# Patient Record
Sex: FEMALE | Race: OTHER | Employment: UNEMPLOYED | URBAN - METROPOLITAN AREA
[De-identification: names, ages, dates, MRNs, and addresses within clinical notes are randomized per-mention and may not be internally consistent; named-entity substitution may affect disease eponyms.]

---

## 2017-11-29 ENCOUNTER — TELEPHONE (OUTPATIENT)
Dept: PEDIATRICS CLINIC | Facility: CLINIC | Age: 27
End: 2017-11-29

## 2017-11-29 NOTE — TELEPHONE ENCOUNTER
Pt was advised our office did not call her today. Pt states no one left a msg. Pt was advised to wait for a return call. Pt agrees with plan.

## 2018-01-08 PROCEDURE — 88175 CYTOPATH C/V AUTO FLUID REDO: CPT | Performed by: FAMILY MEDICINE

## 2018-01-08 PROCEDURE — 87510 GARDNER VAG DNA DIR PROBE: CPT | Performed by: FAMILY MEDICINE

## 2018-01-08 PROCEDURE — 87660 TRICHOMONAS VAGIN DIR PROBE: CPT | Performed by: FAMILY MEDICINE

## 2018-01-08 PROCEDURE — 87491 CHLMYD TRACH DNA AMP PROBE: CPT | Performed by: FAMILY MEDICINE

## 2018-01-08 PROCEDURE — 87591 N.GONORRHOEAE DNA AMP PROB: CPT | Performed by: FAMILY MEDICINE

## 2018-01-08 PROCEDURE — 87480 CANDIDA DNA DIR PROBE: CPT | Performed by: FAMILY MEDICINE

## 2018-07-24 ENCOUNTER — TELEPHONE (OUTPATIENT)
Dept: OBGYN CLINIC | Facility: CLINIC | Age: 28
End: 2018-07-24

## 2018-07-26 ENCOUNTER — OFFICE VISIT (OUTPATIENT)
Dept: OBGYN CLINIC | Facility: CLINIC | Age: 28
End: 2018-07-26
Payer: COMMERCIAL

## 2018-07-26 VITALS
WEIGHT: 137 LBS | DIASTOLIC BLOOD PRESSURE: 80 MMHG | HEIGHT: 62 IN | SYSTOLIC BLOOD PRESSURE: 117 MMHG | HEART RATE: 66 BPM | BODY MASS INDEX: 25.21 KG/M2

## 2018-07-26 DIAGNOSIS — Z30.46 ENCOUNTER FOR REMOVAL OF SUBDERMAL CONTRACEPTIVE IMPLANT: ICD-10-CM

## 2018-07-26 DIAGNOSIS — Z32.00 ENCOUNTER FOR PREGNANCY TEST, RESULT UNKNOWN: Primary | ICD-10-CM

## 2018-07-26 DIAGNOSIS — Z30.46 NEXPLANON REMOVAL: ICD-10-CM

## 2018-07-26 LAB
CONTROL LINE PRESENT WITH A CLEAR BACKGROUND (YES/NO): YES YES/NO
KIT LOT #: NORMAL NUMERIC
PREGNANCY TEST, URINE: NEGATIVE

## 2018-07-26 PROCEDURE — 81025 URINE PREGNANCY TEST: CPT | Performed by: ADVANCED PRACTICE MIDWIFE

## 2018-07-26 PROCEDURE — 11982 REMOVE DRUG IMPLANT DEVICE: CPT | Performed by: ADVANCED PRACTICE MIDWIFE

## 2018-07-27 NOTE — PROCEDURES
Nexplanon Removal    Consent was obtained from the patient. Removal:  1 % lidocaine with Epinephrine was injected underneath the tip of the Nexplanon samia that is closest to the left elbow. Nexplanon was located by palpation.   3 mm incision was made at

## 2019-01-10 ENCOUNTER — TELEPHONE (OUTPATIENT)
Dept: OBGYN CLINIC | Facility: CLINIC | Age: 29
End: 2019-01-10

## 2019-01-10 DIAGNOSIS — Z87.59 HISTORY OF MISCARRIAGE: Primary | ICD-10-CM

## 2019-01-10 NOTE — TELEPHONE ENCOUNTER
Per MES. pt to go for quant bhcg & progesterone on Thursday of next week & will have discussion of results w/ cnm at missed menses appt on Friday 1/18. Pt called & notified of instructions. Advised to call office w/ any bleeding or concerns.  Pt verbalized a

## 2019-01-10 NOTE — TELEPHONE ENCOUNTER
PT said Pregnant and took blood test at [facility] for pregnancy at level 80 Jacob Correia wants pt to take another Bloodtest to see if doubling

## 2019-01-10 NOTE — TELEPHONE ENCOUNTER
LMP 12/13/18. Pt had hpt that was faint positive but had blood preg test 1/3 that was negative. Pt felt she was symptomatic for pregnancy yesterday & did hpt which was positive. Pt went to [facility] (not [facility]) & had quant hcg result of 81.  Was advised to go to ER

## 2019-01-17 ENCOUNTER — TELEPHONE (OUTPATIENT)
Dept: OBGYN CLINIC | Facility: CLINIC | Age: 29
End: 2019-01-17

## 2019-01-17 ENCOUNTER — APPOINTMENT (OUTPATIENT)
Dept: LAB | Facility: HOSPITAL | Age: 29
End: 2019-01-17
Attending: ADVANCED PRACTICE MIDWIFE
Payer: COMMERCIAL

## 2019-01-17 DIAGNOSIS — Z87.59 HISTORY OF MISCARRIAGE: ICD-10-CM

## 2019-01-17 DIAGNOSIS — N92.6 MISSED MENSES: ICD-10-CM

## 2019-01-17 LAB — PROGEST SERPL-MCNC: 23.4 NG/ML

## 2019-01-17 PROCEDURE — 36415 COLL VENOUS BLD VENIPUNCTURE: CPT

## 2019-01-17 PROCEDURE — 84702 CHORIONIC GONADOTROPIN TEST: CPT

## 2019-01-17 PROCEDURE — 84144 ASSAY OF PROGESTERONE: CPT

## 2019-01-17 NOTE — TELEPHONE ENCOUNTER
Spoke with pt and advised she is to come in today to lab for bhcg and progesterone. Pt agreed and voiced understanding.

## 2019-01-18 ENCOUNTER — OFFICE VISIT (OUTPATIENT)
Dept: OBGYN CLINIC | Facility: CLINIC | Age: 29
End: 2019-01-18
Payer: COMMERCIAL

## 2019-01-18 VITALS
SYSTOLIC BLOOD PRESSURE: 118 MMHG | HEART RATE: 94 BPM | DIASTOLIC BLOOD PRESSURE: 63 MMHG | HEIGHT: 62 IN | BODY MASS INDEX: 25.07 KG/M2 | WEIGHT: 136.25 LBS

## 2019-01-18 DIAGNOSIS — N92.6 MISSED MENSES: Primary | ICD-10-CM

## 2019-01-18 LAB
B-HCG SERPL-ACNC: 4391 MIU/ML
CONTROL LINE PRESENT WITH A CLEAR BACKGROUND (YES/NO): YES YES/NO
KIT LOT #: NORMAL NUMERIC
PREGNANCY TEST, URINE: POSITIVE

## 2019-01-18 PROCEDURE — 81025 URINE PREGNANCY TEST: CPT | Performed by: ADVANCED PRACTICE MIDWIFE

## 2019-01-18 PROCEDURE — 99213 OFFICE O/P EST LOW 20 MIN: CPT | Performed by: ADVANCED PRACTICE MIDWIFE

## 2019-01-18 NOTE — PROGRESS NOTES
HPI:    Patient ID: Christofer Hernandez is a 34year old female here for missed menses. Some nausea, no vomiting. Was having some pain last week and seen in urgent care and HCG was 81. Pain has gotten better, occasionally with have full/pressure feeling.  No dys after u/s for viability  10.   Greater than 50% of 20 minute visit spent in face to face counseling    Declined flu vaccine    Meds This Visit:  Requested Prescriptions      No prescriptions requested or ordered in this encounter       Imaging & Referrals:

## 2019-02-01 ENCOUNTER — TELEPHONE (OUTPATIENT)
Dept: OBGYN CLINIC | Facility: CLINIC | Age: 29
End: 2019-02-01

## 2019-02-01 ENCOUNTER — HOSPITAL ENCOUNTER (OUTPATIENT)
Dept: ULTRASOUND IMAGING | Facility: HOSPITAL | Age: 29
Discharge: HOME | End: 2019-02-01
Attending: ADVANCED PRACTICE MIDWIFE
Payer: COMMERCIAL

## 2019-02-01 DIAGNOSIS — N92.6 MISSED MENSES: ICD-10-CM

## 2019-02-01 PROCEDURE — 76801 OB US < 14 WKS SINGLE FETUS: CPT | Performed by: ADVANCED PRACTICE MIDWIFE

## 2019-02-01 NOTE — TELEPHONE ENCOUNTER
Pt presented to office requesting a note for proof of pregnancy and lifting restrictions. 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   Per MJ there are no lifting restrictions

## 2019-02-05 ENCOUNTER — TELEPHONE (OUTPATIENT)
Dept: OBGYN CLINIC | Facility: CLINIC | Age: 29
End: 2019-02-05

## 2019-02-05 NOTE — TELEPHONE ENCOUNTER
----- Message from Gerardo Hayes CNM sent at 2/5/2019  1:40 PM CST -----  Please notify pt of normal u/s which is consistent with dates. Small subchorionic bleed which typically resolves on its own. She should schedule Nurse visit.  Thanks, SAROJ

## 2019-02-11 ENCOUNTER — NURSE ONLY (OUTPATIENT)
Dept: OBGYN CLINIC | Facility: CLINIC | Age: 29
End: 2019-02-11
Payer: COMMERCIAL

## 2019-02-11 DIAGNOSIS — Z3A.08 8 WEEKS GESTATION OF PREGNANCY: Primary | ICD-10-CM

## 2019-02-11 RX ORDER — PRENATAL VIT/IRON FUM/FOLIC AC 27MG-0.8MG
1 TABLET ORAL DAILY
COMMUNITY

## 2019-03-06 NOTE — PROGRESS NOTES
Outpatient Maternal-Fetal Medicine Consultation    Dear Ms. Aditi Johnson    Thank you for requesting a first trimester ultrasound and MFM consultation on your patient Antonio Gonzales.   As you are aware she is a 34year old female [id number] with a singletonpregna . She indicated that her son is alive. Medications:   Current Outpatient Medications:   •  PRENATAL 27-0.8 MG Oral Tab, Take 1 tablet by mouth daily. , Disp: , Rfl:   Allergies: No Known Allergies    PHYSICAL EXAMINATION:  /73   Pulse 96 heterozygote - not clinically relevant    RECOMMENDATIONS:  · Continue care with Ms. Jean Claude Camp with FTS results when they become available. 20 anatomic survey - with OB or level I with MFM at the discretion of primary OB.     Thank you for allow

## 2019-03-07 ENCOUNTER — HOSPITAL ENCOUNTER (OUTPATIENT)
Dept: PERINATAL CARE | Facility: HOSPITAL | Age: 29
Discharge: HOME | End: 2019-03-07
Attending: OBSTETRICS & GYNECOLOGY
Payer: COMMERCIAL

## 2019-03-07 ENCOUNTER — HOSPITAL ENCOUNTER (OUTPATIENT)
Dept: PERINATAL CARE | Facility: HOSPITAL | Age: 29
Discharge: HOME | End: 2019-03-07
Attending: ADVANCED PRACTICE MIDWIFE
Payer: COMMERCIAL

## 2019-03-07 ENCOUNTER — LAB ENCOUNTER (OUTPATIENT)
Dept: LAB | Facility: HOSPITAL | Age: 29
End: 2019-03-07
Attending: ADVANCED PRACTICE MIDWIFE
Payer: COMMERCIAL

## 2019-03-07 ENCOUNTER — TELEPHONE (OUTPATIENT)
Dept: OBGYN CLINIC | Facility: CLINIC | Age: 29
End: 2019-03-07

## 2019-03-07 VITALS
DIASTOLIC BLOOD PRESSURE: 73 MMHG | HEART RATE: 96 BPM | SYSTOLIC BLOOD PRESSURE: 121 MMHG | BODY MASS INDEX: 25 KG/M2 | HEIGHT: 62 IN

## 2019-03-07 DIAGNOSIS — Z36.9 FIRST TRIMESTER SCREENING: Primary | ICD-10-CM

## 2019-03-07 DIAGNOSIS — Z15.89 HETEROZYGOUS MTHFR MUTATION C677T: ICD-10-CM

## 2019-03-07 DIAGNOSIS — Z36.9 FIRST TRIMESTER SCREENING: ICD-10-CM

## 2019-03-07 DIAGNOSIS — Z3A.08 8 WEEKS GESTATION OF PREGNANCY: ICD-10-CM

## 2019-03-07 LAB
ANTIBODY SCREEN: NEGATIVE
BASOPHILS # BLD AUTO: 0.05 X10(3) UL (ref 0–0.2)
BASOPHILS NFR BLD AUTO: 0.4 %
DEPRECATED RDW RBC AUTO: 45.1 FL (ref 35.1–46.3)
EOSINOPHIL # BLD AUTO: 0.08 X10(3) UL (ref 0–0.7)
EOSINOPHIL NFR BLD AUTO: 0.7 %
ERYTHROCYTE [DISTWIDTH] IN BLOOD BY AUTOMATED COUNT: 12.8 % (ref 11–15)
HBV SURFACE AG SER-ACNC: <0.1 [IU]/L
HBV SURFACE AG SERPL QL IA: NONREACTIVE
HCT VFR BLD AUTO: 38 % (ref 35–48)
HCV AB SERPL QL IA: NONREACTIVE
HGB BLD-MCNC: 12.5 G/DL (ref 12–16)
IMM GRANULOCYTES # BLD AUTO: 0.05 X10(3) UL (ref 0–1)
IMM GRANULOCYTES NFR BLD: 0.4 %
LYMPHOCYTES # BLD AUTO: 2.38 X10(3) UL (ref 1–4)
LYMPHOCYTES NFR BLD AUTO: 21.2 %
MCH RBC QN AUTO: 31.6 PG (ref 26–34)
MCHC RBC AUTO-ENTMCNC: 32.9 G/DL (ref 31–37)
MCV RBC AUTO: 96 FL (ref 80–100)
MONOCYTES # BLD AUTO: 0.64 X10(3) UL (ref 0.1–1)
MONOCYTES NFR BLD AUTO: 5.7 %
NEUTROPHILS # BLD AUTO: 8.01 X10 (3) UL (ref 1.5–7.7)
NEUTROPHILS # BLD AUTO: 8.01 X10(3) UL (ref 1.5–7.7)
NEUTROPHILS NFR BLD AUTO: 71.6 %
PLATELET # BLD AUTO: 206 10(3)UL (ref 150–450)
RBC # BLD AUTO: 3.96 X10(6)UL (ref 3.8–5.3)
RH BLOOD TYPE: POSITIVE
RUBV IGG SER QL: POSITIVE
RUBV IGG SER-ACNC: 37.8 IU/ML (ref 10–?)
WBC # BLD AUTO: 11.2 X10(3) UL (ref 4–11)

## 2019-03-07 PROCEDURE — 86803 HEPATITIS C AB TEST: CPT

## 2019-03-07 PROCEDURE — 36415 COLL VENOUS BLD VENIPUNCTURE: CPT

## 2019-03-07 PROCEDURE — 87086 URINE CULTURE/COLONY COUNT: CPT

## 2019-03-07 PROCEDURE — 76813 OB US NUCHAL MEAS 1 GEST: CPT | Performed by: OBSTETRICS & GYNECOLOGY

## 2019-03-07 PROCEDURE — 86762 RUBELLA ANTIBODY: CPT

## 2019-03-07 PROCEDURE — 85025 COMPLETE CBC W/AUTO DIFF WBC: CPT

## 2019-03-07 PROCEDURE — 87340 HEPATITIS B SURFACE AG IA: CPT

## 2019-03-07 PROCEDURE — 86780 TREPONEMA PALLIDUM: CPT

## 2019-03-07 PROCEDURE — 86850 RBC ANTIBODY SCREEN: CPT

## 2019-03-07 PROCEDURE — 86900 BLOOD TYPING SEROLOGIC ABO: CPT

## 2019-03-07 PROCEDURE — 87389 HIV-1 AG W/HIV-1&-2 AB AG IA: CPT

## 2019-03-07 PROCEDURE — 86901 BLOOD TYPING SEROLOGIC RH(D): CPT

## 2019-03-07 PROCEDURE — 99241 OFFICE CONSULTATION,LEVEL I: CPT | Performed by: OBSTETRICS & GYNECOLOGY

## 2019-03-07 NOTE — ADDENDUM NOTE
Encounter addended by: Kennith Harada, RN on: 3/7/2019 9:54 AM   Actions taken: Charge Capture section accepted

## 2019-03-08 ENCOUNTER — INITIAL PRENATAL (OUTPATIENT)
Dept: OBGYN CLINIC | Facility: CLINIC | Age: 29
End: 2019-03-08
Payer: COMMERCIAL

## 2019-03-08 VITALS
WEIGHT: 140 LBS | HEART RATE: 82 BPM | HEIGHT: 62 IN | SYSTOLIC BLOOD PRESSURE: 110 MMHG | DIASTOLIC BLOOD PRESSURE: 75 MMHG | BODY MASS INDEX: 25.76 KG/M2

## 2019-03-08 DIAGNOSIS — Z34.81 PRENATAL CARE, SUBSEQUENT PREGNANCY, FIRST TRIMESTER: Primary | ICD-10-CM

## 2019-03-08 LAB
APPEARANCE: CLEAR
MULTISTIX LOT#: NORMAL NUMERIC
PH, URINE: 7 (ref 4.5–8)
SPECIFIC GRAVITY: 1.01 (ref 1–1.03)
T PALLIDUM AB SER QL: NEGATIVE
URINE-COLOR: YELLOW
UROBILINOGEN,SEMI-QN: 0.2 MG/DL (ref 0–1.9)

## 2019-03-08 PROCEDURE — 81002 URINALYSIS NONAUTO W/O SCOPE: CPT | Performed by: ADVANCED PRACTICE MIDWIFE

## 2019-03-08 NOTE — PROGRESS NOTES
Denies pain or bleeding. Had a couple episodes where felt like had to take deep breaths and a little dizzy. Lungs CTA. Continue to monitor. Recommend regular meals with protein to help keep blood sugar stable. Push fluids. Normal 1st tri screen.  Normal lab

## 2019-03-08 NOTE — TELEPHONE ENCOUNTER
[facility] form received in Forms dept+ FCR+ Signed release. Logged for processing.  LAVON

## 2019-03-11 ENCOUNTER — TELEPHONE (OUTPATIENT)
Dept: PERINATAL CARE | Facility: HOSPITAL | Age: 29
End: 2019-03-11

## 2019-03-11 NOTE — TELEPHONE ENCOUNTER
Recd FTS results for Naila Kelly and Dr Екатерина Orlando reviewed and signed off. Spoke with Vadim Davila and informed her that the risk after screening for Trisomy 13 and 18 is 1 in >10,000 and the risk for Trisomy 21 is 1 in 2,756.     These results are both with rang

## 2019-04-05 ENCOUNTER — ROUTINE PRENATAL (OUTPATIENT)
Dept: OBGYN CLINIC | Facility: CLINIC | Age: 29
End: 2019-04-05
Payer: COMMERCIAL

## 2019-04-05 VITALS
WEIGHT: 140 LBS | HEART RATE: 96 BPM | BODY MASS INDEX: 26 KG/M2 | SYSTOLIC BLOOD PRESSURE: 122 MMHG | DIASTOLIC BLOOD PRESSURE: 77 MMHG

## 2019-04-05 DIAGNOSIS — Z34.82 ENCOUNTER FOR SUPERVISION OF OTHER NORMAL PREGNANCY IN SECOND TRIMESTER: Primary | ICD-10-CM

## 2019-04-05 PROCEDURE — 81002 URINALYSIS NONAUTO W/O SCOPE: CPT | Performed by: ADVANCED PRACTICE MIDWIFE

## 2019-04-05 NOTE — PROGRESS NOTES
Having some right sided low back/hip pain. No flank pain. Was bad about a month ago, has been getting better. Thinks may have pulled a muscle. Recommend Ice, Tylenol, rest. If does not improve could send for PT. Offered AFP.  Will discuss with spouse and le

## 2019-04-08 NOTE — TELEPHONE ENCOUNTER
Cecil Preciado,     Please sign off on form: Disability (pending delivery)   -Highlight the patient and hit \"Chart\" button. -In Chart Review, w/in the Encounter tab - click 1 time on the Telephone call encounter for 3/7/2019. Scroll down the telephone encounter.  -Click \"scan on\" blue Hyperlink under \"Media\" heading for Disability Darlene [place] 4/8/19 w/in the telephone enc.  -Click on Acknowledge button at the bottom right corner and left-click onto image, signature stamp appears and drag signature to Provider signature line. Stamp will turn blue. Close window.     Thank you,  Pepe Shelby

## 2019-04-13 ENCOUNTER — HOSPITAL (OUTPATIENT)
Dept: OTHER | Age: 29
End: 2019-04-13
Attending: PHYSICIAN ASSISTANT

## 2019-05-02 ENCOUNTER — HOSPITAL ENCOUNTER (OUTPATIENT)
Dept: PERINATAL CARE | Facility: HOSPITAL | Age: 29
Discharge: HOME | End: 2019-05-02
Attending: OBSTETRICS & GYNECOLOGY
Payer: COMMERCIAL

## 2019-05-02 ENCOUNTER — HOSPITAL ENCOUNTER (OUTPATIENT)
Dept: PERINATAL CARE | Facility: HOSPITAL | Age: 29
Discharge: HOME | End: 2019-05-02
Attending: ADVANCED PRACTICE MIDWIFE
Payer: COMMERCIAL

## 2019-05-02 VITALS
DIASTOLIC BLOOD PRESSURE: 70 MMHG | HEART RATE: 83 BPM | BODY MASS INDEX: 27.05 KG/M2 | SYSTOLIC BLOOD PRESSURE: 113 MMHG | HEIGHT: 62 IN | WEIGHT: 147 LBS

## 2019-05-02 DIAGNOSIS — Z36.3 SCREENING, ANTENATAL, FOR MALFORMATION BY ULTRASOUND: ICD-10-CM

## 2019-05-02 DIAGNOSIS — Z36.3 SCREENING, ANTENATAL, FOR MALFORMATION BY ULTRASOUND: Primary | ICD-10-CM

## 2019-05-02 PROCEDURE — 99211 OFF/OP EST MAY X REQ PHY/QHP: CPT | Performed by: OBSTETRICS & GYNECOLOGY

## 2019-05-02 PROCEDURE — 76805 OB US >/= 14 WKS SNGL FETUS: CPT | Performed by: OBSTETRICS & GYNECOLOGY

## 2019-05-02 NOTE — PROGRESS NOTES
/70   Pulse 83   Ht 5' 2\" (1.575 m)   Wt 147 lb (66.7 kg)   LMP 12/13/2018   BMI 26.89 kg/m²      STANDARD OBSTETRIC ULTRASOUND REPORT   See imaging tab for complete consultation / ultrasound report      Fetal Heart Rate: Present 150 bpm  Fetal Pres

## 2019-05-08 ENCOUNTER — ROUTINE PRENATAL (OUTPATIENT)
Dept: OBGYN CLINIC | Facility: CLINIC | Age: 29
End: 2019-05-08
Payer: COMMERCIAL

## 2019-05-08 VITALS
BODY MASS INDEX: 27 KG/M2 | SYSTOLIC BLOOD PRESSURE: 113 MMHG | HEART RATE: 97 BPM | DIASTOLIC BLOOD PRESSURE: 74 MMHG | WEIGHT: 145.81 LBS

## 2019-05-08 DIAGNOSIS — Z34.82 ENCOUNTER FOR SUPERVISION OF OTHER NORMAL PREGNANCY IN SECOND TRIMESTER: Primary | ICD-10-CM

## 2019-05-08 PROCEDURE — 81002 URINALYSIS NONAUTO W/O SCOPE: CPT | Performed by: ADVANCED PRACTICE MIDWIFE

## 2019-05-24 ENCOUNTER — TELEPHONE (OUTPATIENT)
Dept: OBGYN CLINIC | Facility: CLINIC | Age: 29
End: 2019-05-24

## 2019-05-24 NOTE — TELEPHONE ENCOUNTER
Spoke with pt who reports she has had vaginal itching and thick discharge for the past couple of days. Pt wondering if she can use OTC med. Pt advised she can use Monistat 3 or 7 day treatment and to call office if she has no improvement of symptoms.  Pt ag

## 2019-06-06 ENCOUNTER — ROUTINE PRENATAL (OUTPATIENT)
Dept: OBGYN CLINIC | Facility: CLINIC | Age: 29
End: 2019-06-06
Payer: COMMERCIAL

## 2019-06-06 VITALS
WEIGHT: 151.13 LBS | SYSTOLIC BLOOD PRESSURE: 102 MMHG | HEART RATE: 83 BPM | DIASTOLIC BLOOD PRESSURE: 66 MMHG | BODY MASS INDEX: 27.81 KG/M2 | HEIGHT: 62 IN

## 2019-06-06 DIAGNOSIS — Z34.82 PRENATAL CARE, SUBSEQUENT PREGNANCY, SECOND TRIMESTER: Primary | ICD-10-CM

## 2019-06-06 PROCEDURE — 81002 URINALYSIS NONAUTO W/O SCOPE: CPT | Performed by: ADVANCED PRACTICE MIDWIFE

## 2019-06-27 ENCOUNTER — LAB ENCOUNTER (OUTPATIENT)
Dept: LAB | Facility: HOSPITAL | Age: 29
End: 2019-06-27
Attending: ADVANCED PRACTICE MIDWIFE
Payer: COMMERCIAL

## 2019-06-27 ENCOUNTER — ROUTINE PRENATAL (OUTPATIENT)
Dept: OBGYN CLINIC | Facility: CLINIC | Age: 29
End: 2019-06-27
Payer: COMMERCIAL

## 2019-06-27 VITALS
WEIGHT: 154 LBS | SYSTOLIC BLOOD PRESSURE: 102 MMHG | DIASTOLIC BLOOD PRESSURE: 68 MMHG | BODY MASS INDEX: 28 KG/M2 | HEART RATE: 92 BPM

## 2019-06-27 DIAGNOSIS — Z34.83 ENCOUNTER FOR SUPERVISION OF OTHER NORMAL PREGNANCY IN THIRD TRIMESTER: Primary | ICD-10-CM

## 2019-06-27 DIAGNOSIS — Z34.82 PRENATAL CARE, SUBSEQUENT PREGNANCY, SECOND TRIMESTER: ICD-10-CM

## 2019-06-27 PROCEDURE — 81002 URINALYSIS NONAUTO W/O SCOPE: CPT | Performed by: ADVANCED PRACTICE MIDWIFE

## 2019-06-27 PROCEDURE — 85027 COMPLETE CBC AUTOMATED: CPT

## 2019-06-27 PROCEDURE — 86780 TREPONEMA PALLIDUM: CPT

## 2019-06-27 PROCEDURE — 82950 GLUCOSE TEST: CPT

## 2019-06-27 PROCEDURE — 36415 COLL VENOUS BLD VENIPUNCTURE: CPT

## 2019-06-27 PROCEDURE — 87389 HIV-1 AG W/HIV-1&-2 AB AG IA: CPT

## 2019-06-27 NOTE — PROGRESS NOTES
Feeling well. Had more left sided MSK pain radiating down leg this week, took a couple days off of work and rested and is now feeling better. Rev options of maternity belt and PT if pain recurs. +FM. 3rd T labs WNL.   Rev warnings/when to call

## 2019-07-11 ENCOUNTER — ROUTINE PRENATAL (OUTPATIENT)
Dept: OBGYN CLINIC | Facility: CLINIC | Age: 29
End: 2019-07-11
Payer: COMMERCIAL

## 2019-07-11 VITALS
SYSTOLIC BLOOD PRESSURE: 111 MMHG | BODY MASS INDEX: 29 KG/M2 | DIASTOLIC BLOOD PRESSURE: 67 MMHG | WEIGHT: 157 LBS | HEART RATE: 104 BPM

## 2019-07-11 DIAGNOSIS — Z34.81 ENCOUNTER FOR SUPERVISION OF OTHER NORMAL PREGNANCY IN FIRST TRIMESTER: Primary | ICD-10-CM

## 2019-07-11 LAB
APPEARANCE: CLEAR
MULTISTIX LOT#: NORMAL NUMERIC
PH, URINE: 6 (ref 4.5–8)
SPECIFIC GRAVITY: 1.01 (ref 1–1.03)
URINE-COLOR: YELLOW
UROBILINOGEN,SEMI-QN: 0 MG/DL (ref 0–1.9)

## 2019-07-11 PROCEDURE — 81002 URINALYSIS NONAUTO W/O SCOPE: CPT | Performed by: ADVANCED PRACTICE MIDWIFE

## 2019-07-11 NOTE — PROGRESS NOTES
Doing well, back pain has improved but having a few RLP/right side groin pain per day. Would like to start PT. Will call back with name of PT by her home. Disc TDAP - will consider. Rev warnings/when to call.

## 2019-07-25 ENCOUNTER — ROUTINE PRENATAL (OUTPATIENT)
Dept: OBGYN CLINIC | Facility: CLINIC | Age: 29
End: 2019-07-25
Payer: COMMERCIAL

## 2019-07-25 VITALS
WEIGHT: 159.25 LBS | BODY MASS INDEX: 29.3 KG/M2 | HEART RATE: 85 BPM | SYSTOLIC BLOOD PRESSURE: 104 MMHG | DIASTOLIC BLOOD PRESSURE: 62 MMHG | HEIGHT: 62 IN

## 2019-07-25 DIAGNOSIS — Z34.83 PRENATAL CARE, SUBSEQUENT PREGNANCY, THIRD TRIMESTER: Primary | ICD-10-CM

## 2019-07-25 LAB
APPEARANCE: CLEAR
MULTISTIX LOT#: NORMAL NUMERIC
PH, URINE: 6 (ref 4.5–8)
SPECIFIC GRAVITY: 1 (ref 1–1.03)
URINE-COLOR: YELLOW
UROBILINOGEN,SEMI-QN: 0.2 MG/DL (ref 0–1.9)

## 2019-07-25 PROCEDURE — 90471 IMMUNIZATION ADMIN: CPT | Performed by: ADVANCED PRACTICE MIDWIFE

## 2019-07-25 PROCEDURE — 90715 TDAP VACCINE 7 YRS/> IM: CPT | Performed by: ADVANCED PRACTICE MIDWIFE

## 2019-07-25 PROCEDURE — 81002 URINALYSIS NONAUTO W/O SCOPE: CPT | Performed by: ADVANCED PRACTICE MIDWIFE

## 2019-07-29 ENCOUNTER — HOSPITAL ENCOUNTER (OUTPATIENT)
Facility: HOSPITAL | Age: 29
Setting detail: OBSERVATION
Discharge: HOME | End: 2019-07-29
Attending: ADVANCED PRACTICE MIDWIFE | Admitting: OBSTETRICS & GYNECOLOGY
Payer: COMMERCIAL

## 2019-07-29 ENCOUNTER — TELEPHONE (OUTPATIENT)
Dept: OBGYN CLINIC | Facility: CLINIC | Age: 29
End: 2019-07-29

## 2019-07-29 VITALS
HEART RATE: 91 BPM | RESPIRATION RATE: 16 BRPM | TEMPERATURE: 99 F | DIASTOLIC BLOOD PRESSURE: 58 MMHG | SYSTOLIC BLOOD PRESSURE: 111 MMHG

## 2019-07-29 PROBLEM — R10.2 PELVIC PAIN AFFECTING PREGNANCY: Status: ACTIVE | Noted: 2019-07-29

## 2019-07-29 PROBLEM — R10.2 PELVIC PAIN AFFECTING PREGNANCY (HCC): Status: ACTIVE | Noted: 2019-07-29

## 2019-07-29 PROBLEM — O26.899 PELVIC PAIN AFFECTING PREGNANCY: Status: ACTIVE | Noted: 2019-07-29

## 2019-07-29 PROBLEM — O26.899 PELVIC PAIN AFFECTING PREGNANCY (HCC): Status: ACTIVE | Noted: 2019-07-29

## 2019-07-29 LAB
BACTERIA UR QL AUTO: NEGATIVE /HPF
BILIRUB UR QL: NEGATIVE
CLARITY UR: CLEAR
COLOR UR: YELLOW
GLUCOSE UR-MCNC: 50 MG/DL
HGB UR QL STRIP.AUTO: NEGATIVE
KETONES UR-MCNC: NEGATIVE MG/DL
NITRITE UR QL STRIP.AUTO: NEGATIVE
PH UR: 6 [PH] (ref 5–8)
PROT UR-MCNC: NEGATIVE MG/DL
RBC #/AREA URNS AUTO: 1 /HPF
SP GR UR STRIP: 1.01 (ref 1–1.03)
UROBILINOGEN UR STRIP-ACNC: <2
VIT C UR-MCNC: NEGATIVE MG/DL
WBC #/AREA URNS AUTO: 1 /HPF

## 2019-07-29 PROCEDURE — 59025 FETAL NON-STRESS TEST: CPT | Performed by: ADVANCED PRACTICE MIDWIFE

## 2019-07-29 NOTE — TRIAGE
[facility]      Triage Note    Rachael Ellsworth Patient Status:  Observation    1990 MRN [id number]   Location [address]  Attending Sari Miranda, [address] Day # 0 PCP DO Mikel Davis P Acoustic Stimulator: No           Nonstress Test Interpretation: Reactive           Nonstress Test Second Interpretation: Reactive                      Additional Comments       Reason for visit: pt c/o suprapubic dull pain that increases to sharp/shooting

## 2019-07-29 NOTE — PROGRESS NOTES
Pt is a 34year old female admitted to TR1/TR1-A. No chief complaint on file. Pt is [id number] 32w4d intra-uterine pregnancy. History obtained. Oriented to room, staff, and plan of care.

## 2019-07-30 ENCOUNTER — ROUTINE PRENATAL (OUTPATIENT)
Dept: OBGYN CLINIC | Facility: CLINIC | Age: 29
End: 2019-07-30
Payer: COMMERCIAL

## 2019-07-30 VITALS
WEIGHT: 163.5 LBS | HEIGHT: 62 IN | DIASTOLIC BLOOD PRESSURE: 65 MMHG | HEART RATE: 92 BPM | BODY MASS INDEX: 30.09 KG/M2 | SYSTOLIC BLOOD PRESSURE: 103 MMHG

## 2019-07-30 DIAGNOSIS — Z34.83 PRENATAL CARE, SUBSEQUENT PREGNANCY, THIRD TRIMESTER: Primary | ICD-10-CM

## 2019-07-30 LAB
APPEARANCE: CLEAR
MULTISTIX LOT#: NORMAL NUMERIC
PH, URINE: 7 (ref 4.5–8)
SPECIFIC GRAVITY: 1.01 (ref 1–1.03)
URINE-COLOR: YELLOW
UROBILINOGEN,SEMI-QN: 0.2 MG/DL (ref 0–1.9)

## 2019-07-30 PROCEDURE — 81002 URINALYSIS NONAUTO W/O SCOPE: CPT | Performed by: ADVANCED PRACTICE MIDWIFE

## 2019-07-30 NOTE — PROGRESS NOTES
Active fetus  No signs signs of PTL. Reviewed S&S of PTL Pt reports that SP pain continues. Information on abdominal support given. Pt will order  Declines PT at this time. Warning signs reviewed  All questions answered.

## 2019-08-09 ENCOUNTER — ROUTINE PRENATAL (OUTPATIENT)
Dept: OBGYN CLINIC | Facility: CLINIC | Age: 29
End: 2019-08-09
Payer: COMMERCIAL

## 2019-08-09 VITALS
DIASTOLIC BLOOD PRESSURE: 69 MMHG | HEART RATE: 97 BPM | WEIGHT: 164.81 LBS | SYSTOLIC BLOOD PRESSURE: 115 MMHG | BODY MASS INDEX: 30 KG/M2

## 2019-08-09 DIAGNOSIS — Z34.83 ENCOUNTER FOR SUPERVISION OF OTHER NORMAL PREGNANCY IN THIRD TRIMESTER: Primary | ICD-10-CM

## 2019-08-09 LAB
APPEARANCE: CLEAR
MULTISTIX LOT#: NORMAL NUMERIC
PH, URINE: 7 (ref 4.5–8)
SPECIFIC GRAVITY: 1.01 (ref 1–1.03)
URINE-COLOR: YELLOW
UROBILINOGEN,SEMI-QN: 0 MG/DL (ref 0–1.9)

## 2019-08-09 PROCEDURE — 81002 URINALYSIS NONAUTO W/O SCOPE: CPT | Performed by: ADVANCED PRACTICE MIDWIFE

## 2019-08-09 NOTE — PROGRESS NOTES
Baby active. [place] when busy and walking a lot, none when not super active. Has been using support belt at work which helps. GBS nv. Signs of PTL reviewed. Importance of active FM.

## 2019-08-23 ENCOUNTER — ROUTINE PRENATAL (OUTPATIENT)
Dept: OBGYN CLINIC | Facility: CLINIC | Age: 29
End: 2019-08-23
Payer: COMMERCIAL

## 2019-08-23 VITALS
WEIGHT: 166.25 LBS | BODY MASS INDEX: 30.59 KG/M2 | HEART RATE: 99 BPM | DIASTOLIC BLOOD PRESSURE: 71 MMHG | SYSTOLIC BLOOD PRESSURE: 109 MMHG | HEIGHT: 62 IN

## 2019-08-23 DIAGNOSIS — Z34.83 PRENATAL CARE, SUBSEQUENT PREGNANCY, THIRD TRIMESTER: Primary | ICD-10-CM

## 2019-08-23 LAB
APPEARANCE: CLEAR
MULTISTIX LOT#: NORMAL NUMERIC
PH, URINE: 7 (ref 4.5–8)
SPECIFIC GRAVITY: 1.02 (ref 1–1.03)
URINE-COLOR: YELLOW
UROBILINOGEN,SEMI-QN: 0.2 MG/DL (ref 0–1.9)

## 2019-08-23 PROCEDURE — 81002 URINALYSIS NONAUTO W/O SCOPE: CPT | Performed by: ADVANCED PRACTICE MIDWIFE

## 2019-08-23 NOTE — PROGRESS NOTES
Baby active. [place] when active. No bleeding or LOF. GBS today. Cervix fingertip, posterior. Warning signs reviewed. Problem: Patient Care Overview  Goal: Plan of Care Review  Outcome: Ongoing (interventions implemented as appropriate)   06/25/18 1033   Coping/Psychosocial   Plan of Care Reviewed With patient   Plan of Care Review   Progress improving   OTHER   Outcome Summary Pt rested well overnight. Denies pain this shift. SR on tele. Will monitor.

## 2019-08-28 ENCOUNTER — ROUTINE PRENATAL (OUTPATIENT)
Dept: OBGYN CLINIC | Facility: CLINIC | Age: 29
End: 2019-08-28
Payer: COMMERCIAL

## 2019-08-28 VITALS
SYSTOLIC BLOOD PRESSURE: 105 MMHG | BODY MASS INDEX: 31 KG/M2 | HEART RATE: 91 BPM | DIASTOLIC BLOOD PRESSURE: 68 MMHG | WEIGHT: 169.38 LBS

## 2019-08-28 DIAGNOSIS — Z34.83 ENCOUNTER FOR SUPERVISION OF OTHER NORMAL PREGNANCY IN THIRD TRIMESTER: Primary | ICD-10-CM

## 2019-08-28 LAB
MULTISTIX LOT#: NORMAL NUMERIC
PH, URINE: 6 (ref 4.5–8)
SPECIFIC GRAVITY: 1.03 (ref 1–1.03)
URINE-COLOR: YELLOW
UROBILINOGEN,SEMI-QN: 0.2 MG/DL (ref 0–1.9)

## 2019-08-28 PROCEDURE — 81002 URINALYSIS NONAUTO W/O SCOPE: CPT | Performed by: ADVANCED PRACTICE MIDWIFE

## 2019-09-04 ENCOUNTER — ROUTINE PRENATAL (OUTPATIENT)
Dept: OBGYN CLINIC | Facility: CLINIC | Age: 29
End: 2019-09-04
Payer: COMMERCIAL

## 2019-09-04 VITALS — WEIGHT: 170.19 LBS | DIASTOLIC BLOOD PRESSURE: 66 MMHG | SYSTOLIC BLOOD PRESSURE: 110 MMHG | BODY MASS INDEX: 31 KG/M2

## 2019-09-04 DIAGNOSIS — Z34.83 ENCOUNTER FOR SUPERVISION OF OTHER NORMAL PREGNANCY IN THIRD TRIMESTER: Primary | ICD-10-CM

## 2019-09-04 LAB
APPEARANCE: CLEAR
MULTISTIX LOT#: NORMAL NUMERIC
PH, URINE: 7 (ref 4.5–8)
SPECIFIC GRAVITY: 1 (ref 1–1.03)
URINE-COLOR: YELLOW
UROBILINOGEN,SEMI-QN: 0.2 MG/DL (ref 0–1.9)

## 2019-09-04 PROCEDURE — 81002 URINALYSIS NONAUTO W/O SCOPE: CPT | Performed by: ADVANCED PRACTICE MIDWIFE

## 2019-09-10 ENCOUNTER — ROUTINE PRENATAL (OUTPATIENT)
Dept: OBGYN CLINIC | Facility: CLINIC | Age: 29
End: 2019-09-10
Payer: COMMERCIAL

## 2019-09-10 ENCOUNTER — HOSPITAL ENCOUNTER (OUTPATIENT)
Facility: HOSPITAL | Age: 29
Setting detail: OBSERVATION
Discharge: HOME | End: 2019-09-10
Attending: ADVANCED PRACTICE MIDWIFE | Admitting: OBSTETRICS & GYNECOLOGY
Payer: COMMERCIAL

## 2019-09-10 VITALS
DIASTOLIC BLOOD PRESSURE: 79 MMHG | WEIGHT: 173 LBS | SYSTOLIC BLOOD PRESSURE: 122 MMHG | BODY MASS INDEX: 32 KG/M2 | HEART RATE: 89 BPM

## 2019-09-10 VITALS — DIASTOLIC BLOOD PRESSURE: 73 MMHG | SYSTOLIC BLOOD PRESSURE: 117 MMHG | HEART RATE: 89 BPM

## 2019-09-10 DIAGNOSIS — Z34.83 ENCOUNTER FOR SUPERVISION OF OTHER NORMAL PREGNANCY IN THIRD TRIMESTER: Primary | ICD-10-CM

## 2019-09-10 PROBLEM — O36.8190 DECREASED FETAL MOVEMENT: Status: ACTIVE | Noted: 2019-09-10

## 2019-09-10 PROBLEM — O36.8190 DECREASED FETAL MOVEMENT (HCC): Status: ACTIVE | Noted: 2019-09-10

## 2019-09-10 LAB
APPEARANCE: CLEAR
MULTISTIX LOT#: NORMAL NUMERIC
PH, URINE: 5 (ref 4.5–8)
SPECIFIC GRAVITY: 1.01 (ref 1–1.03)
URINE-COLOR: YELLOW
UROBILINOGEN,SEMI-QN: 0 MG/DL (ref 0–1.9)

## 2019-09-10 PROCEDURE — 59025 FETAL NON-STRESS TEST: CPT | Performed by: ADVANCED PRACTICE MIDWIFE

## 2019-09-10 PROCEDURE — 81002 URINALYSIS NONAUTO W/O SCOPE: CPT | Performed by: ADVANCED PRACTICE MIDWIFE

## 2019-09-10 NOTE — PROGRESS NOTES
Reports decreased FM over the past couple days. Increasing BH contractions. Denies any leaking of fluid or bleeding. Desires elective IOL at 39 wks on Friday. Cervix 3-4 cm. Reviewed S&S labor Kick counts reviewed.  Warning signs reviewed  All questions an

## 2019-09-10 NOTE — PROGRESS NOTES
Pt is a 34year old female admitted to TR2/TR2-A. Patient presents with:  Decreased Fetal Movement: sent from office for further evaluation     Pt is [id number] 38w5d intra-uterine pregnancy. History obtained, consents signed.  Oriented to room, staff, and

## 2019-09-11 ENCOUNTER — HOSPITAL ENCOUNTER (OUTPATIENT)
Facility: HOSPITAL | Age: 29
Setting detail: OBSERVATION
Discharge: HOME | End: 2019-09-11
Attending: ADVANCED PRACTICE MIDWIFE | Admitting: OBSTETRICS & GYNECOLOGY
Payer: COMMERCIAL

## 2019-09-11 ENCOUNTER — TELEPHONE (OUTPATIENT)
Dept: OBGYN CLINIC | Facility: CLINIC | Age: 29
End: 2019-09-11

## 2019-09-11 ENCOUNTER — TELEPHONE (OUTPATIENT)
Dept: OBGYN UNIT | Facility: HOSPITAL | Age: 29
End: 2019-09-11

## 2019-09-11 VITALS — RESPIRATION RATE: 16 BRPM | HEART RATE: 98 BPM | DIASTOLIC BLOOD PRESSURE: 64 MMHG | SYSTOLIC BLOOD PRESSURE: 116 MMHG

## 2019-09-11 PROBLEM — Z34.90 PREGNANCY (HCC): Status: ACTIVE | Noted: 2019-09-11

## 2019-09-11 PROBLEM — Z34.90 PREGNANCY: Status: ACTIVE | Noted: 2019-09-11

## 2019-09-11 PROCEDURE — 59025 FETAL NON-STRESS TEST: CPT | Performed by: ADVANCED PRACTICE MIDWIFE

## 2019-09-11 NOTE — PROGRESS NOTES
Admitted ambulatory accompanied by  for possible SROM today at 1200. EFM on. Gia Lepe. In to examine pt and do SSE.

## 2019-09-11 NOTE — TELEPHONE ENCOUNTER
Pt is 38w6d and states her water bag broke about 10 min ago. Pt had a gush of fluid from the vagina. Pt states the fluid was clear. Pt denies odor. Pt is GBS Negative. Baby is active.    Pt is having about 3 contractions in an hour and she is still comf

## 2019-09-11 NOTE — PROGRESS NOTES
No ferning on spec exam.  Contractions irregular and only 1 cm more dilated than yesterday.   BODØ in to discuss POC - discharge - with pt and

## 2019-09-11 NOTE — TRIAGE
[facility]      Triage Note    Milena Ross Patient Status:  Observation    1990 MRN [id number]   Location [address] Attending Sylvia Hogan, [address] [facility] Day # 0 PCP DO Mikel Yang P Interpretation: Reactive           Nonstress Test Second Interpretation: Reactive          FHR Category: Category I           Additional Comments       Reason for visit: here for possible SROM and possible labor. Ferning not present.  amnioswab equivocal.

## 2019-09-11 NOTE — TELEPHONE ENCOUNTER
Pt had 2nd \"gush\" of fluid while on toilet 10 mins ago. +fm. Denies ctx occas cramping. Per MES, speak w/ MJ & find out when she'd like pt to come in . GBS neg. Desires waterbirth. Advised pt I will call her back w/ poc.  Pt verbalized an understanding &

## 2019-09-13 ENCOUNTER — HOSPITAL ENCOUNTER (INPATIENT)
Facility: HOSPITAL | Age: 29
LOS: 1 days | Discharge: HOME | End: 2019-09-14
Attending: ADVANCED PRACTICE MIDWIFE | Admitting: OBSTETRICS & GYNECOLOGY
Payer: COMMERCIAL

## 2019-09-13 ENCOUNTER — APPOINTMENT (OUTPATIENT)
Dept: OBGYN CLINIC | Facility: HOSPITAL | Age: 29
End: 2019-09-13
Payer: COMMERCIAL

## 2019-09-13 LAB
ANTIBODY SCREEN: NEGATIVE
DEPRECATED RDW RBC AUTO: 47.6 FL (ref 35.1–46.3)
ERYTHROCYTE [DISTWIDTH] IN BLOOD BY AUTOMATED COUNT: 13.5 % (ref 11–15)
HCT VFR BLD AUTO: 38.2 % (ref 35–48)
HGB BLD-MCNC: 12.8 G/DL (ref 12–16)
MCH RBC QN AUTO: 32.2 PG (ref 26–34)
MCHC RBC AUTO-ENTMCNC: 33.5 G/DL (ref 31–37)
MCV RBC AUTO: 96.2 FL (ref 80–100)
PLATELET # BLD AUTO: 215 10(3)UL (ref 150–450)
RBC # BLD AUTO: 3.97 X10(6)UL (ref 3.8–5.3)
RH BLOOD TYPE: POSITIVE
WBC # BLD AUTO: 12.3 X10(3) UL (ref 4–11)

## 2019-09-13 PROCEDURE — 0HQ9XZZ REPAIR PERINEUM SKIN, EXTERNAL APPROACH: ICD-10-PCS | Performed by: ADVANCED PRACTICE MIDWIFE

## 2019-09-13 PROCEDURE — 59400 OBSTETRICAL CARE: CPT | Performed by: ADVANCED PRACTICE MIDWIFE

## 2019-09-13 RX ORDER — IBUPROFEN 600 MG/1
600 TABLET ORAL ONCE AS NEEDED
Status: DISCONTINUED | OUTPATIENT
Start: 2019-09-13 | End: 2019-09-13 | Stop reason: HOSPADM

## 2019-09-13 RX ORDER — ONDANSETRON 2 MG/ML
4 INJECTION INTRAMUSCULAR; INTRAVENOUS EVERY 6 HOURS PRN
Status: DISCONTINUED | OUTPATIENT
Start: 2019-09-13 | End: 2019-09-14

## 2019-09-13 RX ORDER — LIDOCAINE HYDROCHLORIDE 10 MG/ML
30 INJECTION, SOLUTION EPIDURAL; INFILTRATION; INTRACAUDAL; PERINEURAL ONCE
Status: COMPLETED | OUTPATIENT
Start: 2019-09-13 | End: 2019-09-13

## 2019-09-13 RX ORDER — TRISODIUM CITRATE DIHYDRATE AND CITRIC ACID MONOHYDRATE 500; 334 MG/5ML; MG/5ML
30 SOLUTION ORAL AS NEEDED
Status: DISCONTINUED | OUTPATIENT
Start: 2019-09-13 | End: 2019-09-13 | Stop reason: HOSPADM

## 2019-09-13 RX ORDER — IBUPROFEN 200 MG
400 TABLET ORAL EVERY 4 HOURS PRN
Status: DISCONTINUED | OUTPATIENT
Start: 2019-09-13 | End: 2019-09-14

## 2019-09-13 RX ORDER — DIAPER,BRIEF,INFANT-TODD,DISP
1 EACH MISCELLANEOUS EVERY 6 HOURS PRN
Status: DISCONTINUED | OUTPATIENT
Start: 2019-09-13 | End: 2019-09-14

## 2019-09-13 RX ORDER — AMMONIA INHALANTS 0.04 G/.3ML
0.3 INHALANT RESPIRATORY (INHALATION) AS NEEDED
Status: DISCONTINUED | OUTPATIENT
Start: 2019-09-13 | End: 2019-09-14

## 2019-09-13 RX ORDER — ONDANSETRON 2 MG/ML
4 INJECTION INTRAMUSCULAR; INTRAVENOUS EVERY 6 HOURS PRN
Status: DISCONTINUED | OUTPATIENT
Start: 2019-09-13 | End: 2019-09-13 | Stop reason: HOSPADM

## 2019-09-13 RX ORDER — SODIUM CHLORIDE 0.9 % (FLUSH) 0.9 %
10 SYRINGE (ML) INJECTION AS NEEDED
Status: DISCONTINUED | OUTPATIENT
Start: 2019-09-13 | End: 2019-09-14

## 2019-09-13 RX ORDER — ACETAMINOPHEN 500 MG
500 TABLET ORAL ONCE AS NEEDED
Status: DISCONTINUED | OUTPATIENT
Start: 2019-09-13 | End: 2019-09-13 | Stop reason: HOSPADM

## 2019-09-13 RX ORDER — SIMETHICONE 80 MG
80 TABLET,CHEWABLE ORAL 3 TIMES DAILY PRN
Status: DISCONTINUED | OUTPATIENT
Start: 2019-09-13 | End: 2019-09-14

## 2019-09-13 RX ORDER — CHOLECALCIFEROL (VITAMIN D3) 25 MCG
1 TABLET,CHEWABLE ORAL DAILY
Status: DISCONTINUED | OUTPATIENT
Start: 2019-09-13 | End: 2019-09-14

## 2019-09-13 RX ORDER — IBUPROFEN 600 MG/1
600 TABLET ORAL EVERY 4 HOURS PRN
Status: DISCONTINUED | OUTPATIENT
Start: 2019-09-13 | End: 2019-09-14

## 2019-09-13 RX ORDER — DOCUSATE SODIUM 100 MG/1
100 CAPSULE, LIQUID FILLED ORAL 2 TIMES DAILY
Status: DISCONTINUED | OUTPATIENT
Start: 2019-09-13 | End: 2019-09-14

## 2019-09-13 RX ORDER — BISACODYL 10 MG
10 SUPPOSITORY, RECTAL RECTAL ONCE AS NEEDED
Status: DISCONTINUED | OUTPATIENT
Start: 2019-09-13 | End: 2019-09-14

## 2019-09-13 RX ORDER — SODIUM CHLORIDE 0.9 % (FLUSH) 0.9 %
10 SYRINGE (ML) INJECTION AS NEEDED
Status: DISCONTINUED | OUTPATIENT
Start: 2019-09-13 | End: 2019-09-13 | Stop reason: HOSPADM

## 2019-09-13 RX ORDER — SODIUM CHLORIDE, SODIUM LACTATE, POTASSIUM CHLORIDE, CALCIUM CHLORIDE 600; 310; 30; 20 MG/100ML; MG/100ML; MG/100ML; MG/100ML
INJECTION, SOLUTION INTRAVENOUS CONTINUOUS
Status: DISCONTINUED | OUTPATIENT
Start: 2019-09-13 | End: 2019-09-13 | Stop reason: HOSPADM

## 2019-09-13 RX ORDER — AMMONIA INHALANTS 0.04 G/.3ML
0.3 INHALANT RESPIRATORY (INHALATION) AS NEEDED
Status: DISCONTINUED | OUTPATIENT
Start: 2019-09-13 | End: 2019-09-13 | Stop reason: HOSPADM

## 2019-09-13 RX ORDER — TERBUTALINE SULFATE 1 MG/ML
0.25 INJECTION, SOLUTION SUBCUTANEOUS AS NEEDED
Status: DISCONTINUED | OUTPATIENT
Start: 2019-09-13 | End: 2019-09-13 | Stop reason: HOSPADM

## 2019-09-13 RX ORDER — DEXTROSE, SODIUM CHLORIDE, SODIUM LACTATE, POTASSIUM CHLORIDE, AND CALCIUM CHLORIDE 5; .6; .31; .03; .02 G/100ML; G/100ML; G/100ML; G/100ML; G/100ML
INJECTION, SOLUTION INTRAVENOUS AS NEEDED
Status: DISCONTINUED | OUTPATIENT
Start: 2019-09-13 | End: 2019-09-13 | Stop reason: HOSPADM

## 2019-09-13 RX ORDER — IBUPROFEN 200 MG
200 TABLET ORAL EVERY 4 HOURS PRN
Status: DISCONTINUED | OUTPATIENT
Start: 2019-09-13 | End: 2019-09-14

## 2019-09-13 NOTE — LACTATION NOTE
LACTATION NOTE - MOTHER      Evaluation Type: Inpatient    Problems identified  Problems identified: Knowledge deficit;Milk supply not WNL  Milk supply not WNL: Reduced (potential)    Maternal history  Other/comment: history of MTHFR mutation    Breastfeed breastpump to use when a formula supplement is provided and within 2 hours of providing the supplement, encouraged to pump 8-12 times/24 hours, instructed on care and assembly of pump parts, EBM collection & storage and labeling bottles of EBM.  Assisted wi

## 2019-09-13 NOTE — LACTATION NOTE
This note was copied from a baby's chart.   LACTATION NOTE - INFANT    Evaluation Type  Evaluation Type: Inpatient    Problems & Assessment  Infant Assessment: Hunger cues present;Oral mucous membranes moist;Skin color: pink or appropriate for ethnicity  Mu

## 2019-09-13 NOTE — H&P
[facility] [place] [facility] Patient Status:  In[facility]    1990 MRN [id number]   Location [address] [place] [address] Attending [address] Day # 0 Admitting Kurtis Rodríguez MD     Dutch Allergies:   No Known Allergies  Medications:    Medications Prior to Admission:  PRENATAL 27-0.8 MG Oral Tab Take 1 tablet by mouth daily.  Disp:  Rfl:  9/12/2019 at Unknown time       Review of Systems:   Constitutional: denies fever, aches, chills  ALBERTO Negative 03/07/19 1057    Urine Culture <10,000 cfu/ml Mixture of Gram positive organisms isolated - probable contamination.    03/07/19 1057    Hepatitis B Nonreactive   Nonreactive  03/07/19 1057    HIV Combo Non-Reactive  Non-Reactive 03/07/19 1057 Urine Culture        HIV Combo        GC DNA        Chlamydia DNA              Optional Labs     Test Value Reference Range Date Time    HgB A1c        HGB Electrophoresis        Varicella Zoster        Cystic Fibrosis-Old         Cystic Fibrosis[32] (Requ

## 2019-09-13 NOTE — PROGRESS NOTES
Patient up to bathroom with assist x 2. Voided 250 to void at this time. Patient transferred to mother/baby room 351 per wheelchair in stable condition with baby and personal belongings. Accompanied by significant other and staff.   Report given to mother

## 2019-09-13 NOTE — PROGRESS NOTES
Pt is a 34year old female admitted to [address] [place] [address]. Patient presents with:  Scheduled Induction     Pt is [id number] 39w1d intra-uterine pregnancy. History obtained, consents signed. Oriented to room, staff, and plan of care.

## 2019-09-13 NOTE — PLAN OF CARE
Problem: Patient Centered Care  Goal: Patient preferences are identified and integrated in the patient's plan of care  Description  Interventions:  - What would you like us to know as we care for you?  Would like to use the tub for pain management, along evaluate response  - Consider cultural and social influences on pain and pain management  - Manage/alleviate anxiety  - Utilize distraction and/or relaxation techniques  - Monitor for opioid side effects  - Notify MD/LIP if interventions unsuccessful or pa

## 2019-09-13 NOTE — PROGRESS NOTES
pT arrived to room 351 via wheelchair with infant, all vitals and asessment of both mother and infant WNL.     Report from [facility]

## 2019-09-13 NOTE — PAYOR COMM NOTE
--------------  ADMISSION REVIEW     Payor: [employer]  Subscriber #:  [id number]  Authorization Number: [id number]    Admit date: 9/13/19  Admit time: [address]       Admitting Physician: Maral Carnes MD  Attending Physician:  Karolina Schuler, pain,denies heartburn, denies constipation or diarrhea  : denies dysuria, pelvic pain, vaginal discharge or bleeding  Musculoskeletal: denies back or joint pain  Neuro denies headaches or dizziness  Psych: denies depression or anxiety    Physical Exam: lesion or malignancy    01/08/18 1910    HPV        GC DNA        Chlamydia DNA        GTT 1 Hr        Glucose Fasting        Glucose 1 Hr        Glucose 2 Hr        Glucose 3 Hr        HgB A1c        Vitamin D              8-20 Weeks     Test Value Refere Cystic Fibrosis[165] (Required questions in OE to answer)        Sickle Cell        24Hr Urine Protein        24Hr Urine Creatinine        Parvo B19 IgM        Parvo B19 IgG                    Reviewed all prenatal ultrasounds    Admit labs pending      As

## 2019-09-13 NOTE — L&D DELIVERY NOTE
Oliva Barney [[id number]]    Labor Events     labor?:  No  Antibiotics received during labor?:  No  Antibiotics (enter # doses in comment):  none  Rupture date/time:  2019 1003     Rupture type:  AROM  Fluid color:  Pink  Fluid color comme Discarded     Apgars    Living status:  Living   Apgar Scoring Key:     0 1 2    Skin color Blue or pale Acrocyanotic Completely pink    Heart rate Absent <100 bpm >100 bpm    Reflex irritability No response Grimace Cry or active withdrawal    Muscle tone

## 2019-09-14 VITALS
HEIGHT: 62.01 IN | SYSTOLIC BLOOD PRESSURE: 118 MMHG | WEIGHT: 173 LBS | HEART RATE: 96 BPM | BODY MASS INDEX: 31.43 KG/M2 | RESPIRATION RATE: 16 BRPM | DIASTOLIC BLOOD PRESSURE: 61 MMHG | TEMPERATURE: 98 F

## 2019-09-14 LAB
BASOPHILS # BLD AUTO: 0.11 X10(3) UL (ref 0–0.2)
BASOPHILS NFR BLD AUTO: 0.7 %
DEPRECATED RDW RBC AUTO: 47.8 FL (ref 35.1–46.3)
EOSINOPHIL # BLD AUTO: 0.12 X10(3) UL (ref 0–0.7)
EOSINOPHIL NFR BLD AUTO: 0.8 %
ERYTHROCYTE [DISTWIDTH] IN BLOOD BY AUTOMATED COUNT: 13.5 % (ref 11–15)
HCT VFR BLD AUTO: 34.9 % (ref 35–48)
HGB BLD-MCNC: 11.6 G/DL (ref 12–16)
IMM GRANULOCYTES # BLD AUTO: 0.29 X10(3) UL (ref 0–1)
IMM GRANULOCYTES NFR BLD: 2 %
LYMPHOCYTES # BLD AUTO: 3.58 X10(3) UL (ref 1–4)
LYMPHOCYTES NFR BLD AUTO: 24.4 %
MCH RBC QN AUTO: 32.2 PG (ref 26–34)
MCHC RBC AUTO-ENTMCNC: 33.2 G/DL (ref 31–37)
MCV RBC AUTO: 96.9 FL (ref 80–100)
MONOCYTES # BLD AUTO: 1.4 X10(3) UL (ref 0.1–1)
MONOCYTES NFR BLD AUTO: 9.5 %
NEUTROPHILS # BLD AUTO: 9.2 X10 (3) UL (ref 1.5–7.7)
NEUTROPHILS # BLD AUTO: 9.2 X10(3) UL (ref 1.5–7.7)
NEUTROPHILS NFR BLD AUTO: 62.6 %
PLATELET # BLD AUTO: 185 10(3)UL (ref 150–450)
RBC # BLD AUTO: 3.6 X10(6)UL (ref 3.8–5.3)
WBC # BLD AUTO: 14.7 X10(3) UL (ref 4–11)

## 2019-09-14 NOTE — PROGRESS NOTES
[facility]    OB/GYNE Progress Note      Kit Setter Patient Status:  Inpatient    1990 MRN [id number]   Location [facility] 3SE Attending ABUNDIO Jacobson   Hosp Day # 1 PCP Mariana Bryan, DO        Assessment/Plan zacarias catheter been removed: Not Applicable          Results:     Lab Results   Component Value Date    TREPONEMALAB Negative 06/27/2019    ABO O 09/13/2019    RH Positive 09/13/2019    WBC 14.7 (H) 09/14/2019    HGB 11.6 (L) 09/14/2019    HCT 34.9 (L) 09/

## 2019-09-14 NOTE — DISCHARGE SUMMARY
[facility]    Discharge Summary    Cuca Kee Patient Status:  Inpatient    1990 MRN [id number]   Location [facility] 3SE Attending ABUNDIO Gamboa   Hosp Day # 1       Delivering OB Clinician: Aparna Ocampo,

## 2019-09-14 NOTE — LACTATION NOTE
LACTATION NOTE - MOTHER      Evaluation Type: Inpatient    Problems identified  Problems identified: Return to work issues         Breastfeeding goal  Breastfeeding goal: To maintain breast milk feeding per patient goal    Maternal Assessment  Bilateral Br

## 2019-09-14 NOTE — PLAN OF CARE
Problem: Patient Centered Care  Goal: Patient preferences are identified and integrated in the patient's plan of care  Description  Interventions:  - What would you like us to know as we care for you?   - Provide timely, complete, and accurate informatio distraction and/or relaxation techniques  - Monitor for opioid side effects  - Notify MD/LIP if interventions unsuccessful or patient reports new pain  - Anticipate increased pain with activity and pre-medicate as appropriate  Outcome: Completed     Proble rooting, lip smacking, sucking fingers/hand) versus late cue of crying.  - Discuss/demonstrate breast feeding aids (e.g., infant sling, nursing footstool/pillows, and breast pumps).   - Encourage mother/other family members to express feelings/concerns, and

## 2019-09-17 ENCOUNTER — TELEPHONE (OUTPATIENT)
Dept: OBGYN CLINIC | Facility: CLINIC | Age: 29
End: 2019-09-17

## 2019-10-21 ENCOUNTER — POSTPARTUM (OUTPATIENT)
Dept: OBGYN CLINIC | Facility: CLINIC | Age: 29
End: 2019-10-21
Payer: COMMERCIAL

## 2019-10-21 VITALS
SYSTOLIC BLOOD PRESSURE: 109 MMHG | DIASTOLIC BLOOD PRESSURE: 74 MMHG | BODY MASS INDEX: 28 KG/M2 | WEIGHT: 153 LBS | HEART RATE: 69 BPM

## 2019-10-21 NOTE — PROGRESS NOTES
Patient here for postpartum check-up. Vaginal delivery @ 6 weeks ago with CARMENZA BR. Breastfeeding exclusively, on demand. Baby with adequate weight gain. Denies fevers, chills, body aches and flu-like symptoms.   Denies abdominal pain, no pelvic pain, repor

## 2020-01-13 ENCOUNTER — TELEPHONE (OUTPATIENT)
Dept: OBGYN CLINIC | Facility: CLINIC | Age: 30
End: 2020-01-13

## 2020-01-13 NOTE — TELEPHONE ENCOUNTER
In Dec pt reports super sore cracked nipples & shooting pain w/ nursing. 2 days later infant was diagnosed w/ thrush & placed on oral nystatin & pt was giv nystatin cream for nipples by peds.  Symptoms never got better & pt was back w/ infant at [place] [address]

## 2020-01-14 ENCOUNTER — OFFICE VISIT (OUTPATIENT)
Dept: OBGYN CLINIC | Facility: CLINIC | Age: 30
End: 2020-01-14

## 2020-01-14 VITALS
WEIGHT: 150 LBS | TEMPERATURE: 99 F | HEART RATE: 76 BPM | BODY MASS INDEX: 27 KG/M2 | SYSTOLIC BLOOD PRESSURE: 107 MMHG | DIASTOLIC BLOOD PRESSURE: 67 MMHG

## 2020-01-14 DIAGNOSIS — B37.89 CANDIDIASIS OF BREAST: Primary | ICD-10-CM

## 2020-01-14 PROCEDURE — 99213 OFFICE O/P EST LOW 20 MIN: CPT | Performed by: ADVANCED PRACTICE MIDWIFE

## 2020-01-14 RX ORDER — FLUCONAZOLE 200 MG/1
200 TABLET ORAL DAILY
Qty: 16 TABLET | Refills: 0 | Status: SHIPPED | OUTPATIENT
Start: 2020-01-14 | End: 2020-08-05

## 2020-01-15 PROBLEM — B37.89 CANDIDIASIS OF BREAST: Status: ACTIVE | Noted: 2020-01-15

## 2020-01-15 NOTE — PROGRESS NOTES
Mara Gonzalez is a 34year old female. HPI:   Patient presents with: Follow - Up: still having symptoms of thursh which has been going on for 4 weeks. Baby dx with thrush 4 weeks ago.  Baby was tx and the peds also gave her some cream to apply to menstrual problem, pelvic pain, dyspareunia, sexual dysfunction, breast mass and hot flashes. Musculoskeletal: Negative for myalgias, back pain, joint swelling, joint pain, gait problem, neck pain and neck stiffness.        PHYSICAL EXAM:      01/14/20  1

## 2020-06-10 NOTE — PROGRESS NOTES
Active baby ,normal level I ultrasound. Routine PNC. Reviewed danger signs. TELEPHONE OFFICE VISIT      Patient: Elbert Flores Date of Service: 6/10/2020   : 1949 MRN: [id number]     SUBJECTIVE:     Due to COVID-19 ACTION PLAN, the patient's office visit was converted to a phone visit.    This patient verbally consents to a telephone visit.    Patient states he is Elbert Flores and that he is speaking to me from his home. I am speaking to him from my office.    It has been 6  months since the patient's last in-office visit.     Patient notes the following changes in medical history since last visit:     Chief Complaint   Patient presents with   • Medication Management       HISTORY OF PRESENT ILLNESS:  Elbert Flores is a 70 year old male     Patient offers the following concerns:   Temp: afebrile  Weight: 193 pounds, gained weight with psychiatric Rxs.  BP: 114/73    Denies contact with individuals who have tested positive for COVID-19 or that are suspected of having COVID-19. No recent travel. No recent international travel to countries where COVID-19 is confirmed or suspected.     Doing ok. Last counseling 2019, have not needed. Came back from [place] middle of March.      Saw Psychiatrist  Dr. Lay. She did not change Rxs. Has f/u this week.     Spoke to cardio recently, wants ECHO done to look at mitral valve. Cardio did not like low HR (50s); walking (60s), higher with exercise/activity;  ?pacemaker.      Diet: healthy, less carbs. 3 meals/d  Exercise: Video aerobics 1 h/d, weight lifting program, gardening  Bms: daily  Urination: ok, saw urologist.   Sleep: good with psychiatric Rxs.   Vision check: ? 5 years.   Dental check: 2019   Stress: COVID  Living environment: own home, condo in [place], live with wife  Support: wife  Smoking: none now, father smoked cigars, mom outside.   Cscope: Cscope last 2016 Dr. Guerrier. polyps removed. repeat in 5 years.   had EGD  On ranitidine.   Immunizations:  TDAP: 2012  Flu vaccine: yes in 2019  Pneumonia 13 vaccine: 2018  Pneumonia 23  vaccine: 2017  Shingles vaccine: zostvax    The following testing was reviewed during this phone visit: 2019    Medication and allergy reconciliation completed over the phone.       REVIEW OF SYSTEMS:  Review of Systems   Constitutional: Negative.    HENT: Negative.    Eyes: Negative.    Respiratory: Negative.    Cardiovascular: Negative.    Gastrointestinal: Negative.    Endocrine: Negative.    Genitourinary: Negative.    Musculoskeletal: Negative.    Skin: Negative.    Allergic/Immunologic: Negative.    Neurological: Negative.    Hematological: Negative.    Psychiatric/Behavioral: Negative.         MEDICATIONS:  Current Outpatient Medications   Medication Sig   • simvastatin (ZOCOR) 10 MG tablet Take 1 tablet by mouth nightly.   • famotidine (PEPCID) 40 MG tablet Take 1 tablet by mouth 2 times daily.   • lisinopril (ZESTRIL) 10 MG tablet Take 1 tablet by mouth daily.   • sertraline (ZOLOFT) 50 MG tablet Take 1 tablet by mouth daily.   • busPIRone (BUSPAR) 15 MG tablet TAKE 1 TABLET BY MOUTH TWICE A DAY   • ALPRAZolam (XANAX) 0.25 MG tablet Take 1 tablet by mouth 2 times daily as needed for Anxiety.     No current facility-administered medications for this visit.         ALLERGIES:  ALLERGIES:   Allergen Reactions   • Sulfa Antibiotics Other (See Comments)     Unknown         PROBLEM LIST:    Patient Active Problem List   Diagnosis   • BPH without urinary obstruction   • Colon polyps   • Diverticulosis of intestine without bleeding   • Essential hypertension   • Gastroesophageal reflux disease   • Hiatal hernia   • Hypercholesterolemia   • Internal hemorrhoids   • Irritable bowel syndrome (IBS)   • Poor urinary stream   • Schatzki's ring of distal esophagus   • Anxiety   • Panic attack as reaction to stress   • Cerebral cysts       PAST MEDICAL HISTORY:  Past Medical History:   Diagnosis Date   • BPH without urinary obstruction    • Colon polyp    • Diverticulosis    • Essential hypertension    • Gastroesophageal  reflux    • Hiatal hernia    • Hypercholesterolemia    • Internal hemorrhoids    • Irritable bowel syndrome    • Poor urinary stream    • Schatzki's ring        PAST SURGICAL HISTORY:  Past Surgical History:   Procedure Laterality Date   • Colonoscopy     • Esophagogastroduodenoscopy     • Oral surgery procedure      sinus lift, extractions, implants   • Upper gastrointestinal endoscopy         FAMILY HISTORY:  Family History   Problem Relation Age of Onset   • Cancer, Breast Mother    • Cancer, Colon Mother    • Heart disease Mother    • Coronary Artery Disease Father    • Hyperlipidemia Father    • Heart disease Father    • Anxiety disorder Father    • Stroke Other    • Anxiety disorder Daughter    • Anxiety disorder Son        SOCIAL HISTORY:  Social History     Tobacco Use   • Smoking status: Never Smoker   • Smokeless tobacco: Never Used   Substance Use Topics   • Alcohol use: Not Currently     Comment: occasional, socially   • Drug use: No         OBJECTIVE:     Patient is doing home BP checks: Yes  CONSTITUTIONAL:  Awake, alert, cooperative, no apparent distress, speaking in full sentences  NEUROLOGIC: alert and oriented to time, place and person  PSYCHIATRIC: good judgment, pleasant      ASSESSMENT AND PLAN:   This is a 70 year old year-old male who presents with:    The following problems were addressed during today's call:  1. Essential hypertension    2. Hypercholesterolemia    3. BPH without urinary obstruction    4. Anxiety    5. Panic attack as reaction to stress    6. Hiatal hernia    7. Gastroesophageal reflux disease, esophagitis presence not specified        The following was ordered during today's call:   Orders Placed This Encounter   • CBC with Automated Differential   • Comprehensive Metabolic Panel   • Lipid Panel Without Reflex   • Thyroid Stimulating Hormone Reflex   • PSA   • simvastatin (ZOCOR) 10 MG tablet   • famotidine (PEPCID) 40 MG tablet   • lisinopril (ZESTRIL) 10 MG tablet   •  sertraline (ZOLOFT) 50 MG tablet   • busPIRone (BUSPAR) 15 MG tablet   • ALPRAZolam (XANAX) 0.25 MG tablet     1. Screening labs ordered, further recommendations post!  2. Cont f/u with psychiatrist, cardio, GI  3. Discussed healthy diet (low fat/chol/salt), regular exercise (at least 3x/week, 30 min/d), weight management/loss.  4. Low salt diet, regular exercise, weight loss/management, monitor BP at home regularly, report if >140/90.  5. SHingrix recommended.   6. Did discuss in length COVID-19 symptoms and precautions to take to prevent infection (frequent hand washing, avoid sick contacts, social distancing 6 feet apart rule and use of masks in all public settings). If having COVID symptoms advised to seek medical care (call our office) or go to ER if moderate to severe symptoms.      Testing should be completed prior to next visit.   New prescriptions / refills sent to the pharmacy.    Patient was advised to call if they experience any new or worsening symptoms.    Time spent talking with patient during today's call: 21 minutes      FOLLOW UP: Return in about 6 months (around 12/10/2020).      Proper usage and side effects of medications reviewed and discussed.   Patient education completed on disease process, etiology, and prognosis.  Return to clinic as clinically indicated.    All questions answered and patient verbalized understanding of the conversation/diagnoses and plan of care.

## 2020-08-05 ENCOUNTER — PATIENT MESSAGE (OUTPATIENT)
Dept: OBGYN CLINIC | Facility: CLINIC | Age: 30
End: 2020-08-05

## 2020-08-05 DIAGNOSIS — B37.89 CANDIDIASIS OF BREAST: ICD-10-CM

## 2020-08-05 RX ORDER — FLUCONAZOLE 200 MG/1
200 TABLET ORAL DAILY
Qty: 16 TABLET | Refills: 0 | Status: SHIPPED | OUTPATIENT
Start: 2020-08-05 | End: 2020-08-13

## 2020-08-13 ENCOUNTER — OFFICE VISIT (OUTPATIENT)
Dept: OBGYN CLINIC | Facility: CLINIC | Age: 30
End: 2020-08-13
Payer: COMMERCIAL

## 2020-08-13 VITALS
HEIGHT: 63 IN | SYSTOLIC BLOOD PRESSURE: 117 MMHG | DIASTOLIC BLOOD PRESSURE: 71 MMHG | BODY MASS INDEX: 26.4 KG/M2 | WEIGHT: 149 LBS | HEART RATE: 86 BPM

## 2020-08-13 DIAGNOSIS — N92.6 MISSED MENSES: Primary | ICD-10-CM

## 2020-08-13 LAB
CONTROL LINE PRESENT WITH A CLEAR BACKGROUND (YES/NO): YES YES/NO
KIT LOT #: NORMAL NUMERIC
PREGNANCY TEST, URINE: POSITIVE

## 2020-08-13 PROCEDURE — 3078F DIAST BP <80 MM HG: CPT | Performed by: ADVANCED PRACTICE MIDWIFE

## 2020-08-13 PROCEDURE — 99213 OFFICE O/P EST LOW 20 MIN: CPT | Performed by: ADVANCED PRACTICE MIDWIFE

## 2020-08-13 PROCEDURE — 81025 URINE PREGNANCY TEST: CPT | Performed by: ADVANCED PRACTICE MIDWIFE

## 2020-08-13 PROCEDURE — 3074F SYST BP LT 130 MM HG: CPT | Performed by: ADVANCED PRACTICE MIDWIFE

## 2020-08-13 PROCEDURE — 3008F BODY MASS INDEX DOCD: CPT | Performed by: ADVANCED PRACTICE MIDWIFE

## 2020-08-19 NOTE — PROGRESS NOTES
HPI:   Yoan Pulido is a 34year old female who presents for a missed menses visit. Happy about pregnancy. Patient presents with:  Gyn Exam: previous pt, Missed menses. LMP 7/5/20 with 29 day cycles. Is breastfeeding 4-5 x daily.        Wt Readings from L tenderness  GI: denies abdominal pain, +nausea no vomiting  : denies urinary complaints (frequency, dysuria, urgency), denies malodorous vaginal discharge or itching, reports periods regular prior to missed menses   MUSCULOSKELETAL: denies back pain  PSY

## 2020-08-31 ENCOUNTER — HOSPITAL ENCOUNTER (OUTPATIENT)
Dept: ULTRASOUND IMAGING | Age: 30
Discharge: HOME | End: 2020-08-31
Attending: ADVANCED PRACTICE MIDWIFE
Payer: COMMERCIAL

## 2020-08-31 DIAGNOSIS — N92.6 MISSED MENSES: ICD-10-CM

## 2020-08-31 PROCEDURE — 76801 OB US < 14 WKS SINGLE FETUS: CPT | Performed by: ADVANCED PRACTICE MIDWIFE

## 2020-09-01 ENCOUNTER — TELEPHONE (OUTPATIENT)
Dept: OBGYN CLINIC | Facility: CLINIC | Age: 30
End: 2020-09-01

## 2020-09-02 ENCOUNTER — TELEPHONE (OUTPATIENT)
Dept: OBGYN CLINIC | Facility: CLINIC | Age: 30
End: 2020-09-02

## 2020-09-02 NOTE — TELEPHONE ENCOUNTER
----- Message from Yany Vargas CNM sent at 9/2/2020  9:10 AM CDT -----  Normal ultrasound results. LANDRY 4/16/2021. Please call to inform.

## 2020-09-15 ENCOUNTER — NURSE ONLY (OUTPATIENT)
Dept: OBGYN CLINIC | Facility: CLINIC | Age: 30
End: 2020-09-15
Payer: COMMERCIAL

## 2020-09-15 VITALS — BODY MASS INDEX: 26 KG/M2 | WEIGHT: 149 LBS

## 2020-09-15 DIAGNOSIS — Z34.80 SUPERVISION OF OTHER NORMAL PREGNANCY: Primary | ICD-10-CM

## 2020-09-15 NOTE — PROGRESS NOTES
Nurse education complete via phone visit. Pt instructed to  folder & handouts at next visit. Pt nursing 5year old & plans on tandem nursing. Discussed caloric & calcium needs. NOB labs ordered. All pap results in Epic.  Unsure if desires FTS or cell

## 2020-09-25 ENCOUNTER — LAB ENCOUNTER (OUTPATIENT)
Dept: LAB | Facility: HOSPITAL | Age: 30
End: 2020-09-25
Attending: ADVANCED PRACTICE MIDWIFE
Payer: COMMERCIAL

## 2020-09-25 ENCOUNTER — INITIAL PRENATAL (OUTPATIENT)
Dept: OBGYN CLINIC | Facility: CLINIC | Age: 30
End: 2020-09-25
Payer: COMMERCIAL

## 2020-09-25 VITALS
DIASTOLIC BLOOD PRESSURE: 72 MMHG | SYSTOLIC BLOOD PRESSURE: 108 MMHG | WEIGHT: 151 LBS | HEIGHT: 63 IN | HEART RATE: 79 BPM | BODY MASS INDEX: 26.75 KG/M2

## 2020-09-25 DIAGNOSIS — Z34.81 ENCOUNTER FOR SUPERVISION OF OTHER NORMAL PREGNANCY IN FIRST TRIMESTER: Primary | ICD-10-CM

## 2020-09-25 DIAGNOSIS — Z34.80 SUPERVISION OF OTHER NORMAL PREGNANCY: ICD-10-CM

## 2020-09-25 DIAGNOSIS — O99.891 BACK PAIN IN PREGNANCY: ICD-10-CM

## 2020-09-25 DIAGNOSIS — M54.9 BACK PAIN IN PREGNANCY: ICD-10-CM

## 2020-09-25 PROBLEM — B37.89 CANDIDIASIS OF BREAST: Status: RESOLVED | Noted: 2020-01-15 | Resolved: 2020-09-25

## 2020-09-25 PROBLEM — O36.8190 DECREASED FETAL MOVEMENT: Status: RESOLVED | Noted: 2019-09-10 | Resolved: 2020-09-25

## 2020-09-25 PROBLEM — O36.8190 DECREASED FETAL MOVEMENT (HCC): Status: RESOLVED | Noted: 2019-09-10 | Resolved: 2020-09-25

## 2020-09-25 LAB
ANTIBODY SCREEN: NEGATIVE
BASOPHILS # BLD AUTO: 0.05 X10(3) UL (ref 0–0.2)
BASOPHILS NFR BLD AUTO: 0.5 %
DEPRECATED RDW RBC AUTO: 44.1 FL (ref 35.1–46.3)
EOSINOPHIL # BLD AUTO: 0.08 X10(3) UL (ref 0–0.7)
EOSINOPHIL NFR BLD AUTO: 0.7 %
ERYTHROCYTE [DISTWIDTH] IN BLOOD BY AUTOMATED COUNT: 12.6 % (ref 11–15)
HBV SURFACE AG SER-ACNC: <0.1 [IU]/L
HBV SURFACE AG SERPL QL IA: NONREACTIVE
HCT VFR BLD AUTO: 38.2 %
HCV AB SERPL QL IA: NONREACTIVE
HGB BLD-MCNC: 13.1 G/DL
IMM GRANULOCYTES # BLD AUTO: 0.04 X10(3) UL (ref 0–1)
IMM GRANULOCYTES NFR BLD: 0.4 %
LYMPHOCYTES # BLD AUTO: 2.75 X10(3) UL (ref 1–4)
LYMPHOCYTES NFR BLD AUTO: 24.8 %
MCH RBC QN AUTO: 32.7 PG (ref 26–34)
MCHC RBC AUTO-ENTMCNC: 34.3 G/DL (ref 31–37)
MCV RBC AUTO: 95.3 FL
MONOCYTES # BLD AUTO: 0.71 X10(3) UL (ref 0.1–1)
MONOCYTES NFR BLD AUTO: 6.4 %
NEUTROPHILS # BLD AUTO: 7.45 X10 (3) UL (ref 1.5–7.7)
NEUTROPHILS # BLD AUTO: 7.45 X10(3) UL (ref 1.5–7.7)
NEUTROPHILS NFR BLD AUTO: 67.2 %
PLATELET # BLD AUTO: 196 10(3)UL (ref 150–450)
RBC # BLD AUTO: 4.01 X10(6)UL
RH BLOOD TYPE: POSITIVE
RUBV IGG SER QL: POSITIVE
RUBV IGG SER-ACNC: 54.7 IU/ML (ref 10–?)
WBC # BLD AUTO: 11.1 X10(3) UL (ref 4–11)

## 2020-09-25 PROCEDURE — 36415 COLL VENOUS BLD VENIPUNCTURE: CPT

## 2020-09-25 PROCEDURE — 3074F SYST BP LT 130 MM HG: CPT | Performed by: ADVANCED PRACTICE MIDWIFE

## 2020-09-25 PROCEDURE — 87086 URINE CULTURE/COLONY COUNT: CPT

## 2020-09-25 PROCEDURE — 3008F BODY MASS INDEX DOCD: CPT | Performed by: ADVANCED PRACTICE MIDWIFE

## 2020-09-25 PROCEDURE — 86900 BLOOD TYPING SEROLOGIC ABO: CPT

## 2020-09-25 PROCEDURE — 86780 TREPONEMA PALLIDUM: CPT

## 2020-09-25 PROCEDURE — 87389 HIV-1 AG W/HIV-1&-2 AB AG IA: CPT

## 2020-09-25 PROCEDURE — 86901 BLOOD TYPING SEROLOGIC RH(D): CPT

## 2020-09-25 PROCEDURE — 3078F DIAST BP <80 MM HG: CPT | Performed by: ADVANCED PRACTICE MIDWIFE

## 2020-09-25 PROCEDURE — 85025 COMPLETE CBC W/AUTO DIFF WBC: CPT

## 2020-09-25 PROCEDURE — 87147 CULTURE TYPE IMMUNOLOGIC: CPT

## 2020-09-25 PROCEDURE — 86850 RBC ANTIBODY SCREEN: CPT

## 2020-09-25 PROCEDURE — 86803 HEPATITIS C AB TEST: CPT

## 2020-09-25 PROCEDURE — 87340 HEPATITIS B SURFACE AG IA: CPT

## 2020-09-25 PROCEDURE — 86762 RUBELLA ANTIBODY: CPT

## 2020-09-25 NOTE — PROGRESS NOTES
Unable to hear FHT on doppler. + cardiac activity & + fetal movement on limited bs u/s. Still breastfeeding 5 yr old, only had 1 menses. 7 wk u/s with 5 day difference so will change LANDRY to 4/16. Warning signs reviewed. Just had blood drawn.  Normal PE toda

## 2020-09-28 LAB
C TRACH DNA SPEC QL NAA+PROBE: NEGATIVE
N GONORRHOEA DNA SPEC QL NAA+PROBE: NEGATIVE
T PALLIDUM AB SER QL: NEGATIVE

## 2020-09-29 ENCOUNTER — TELEPHONE (OUTPATIENT)
Dept: OBGYN CLINIC | Facility: CLINIC | Age: 30
End: 2020-09-29

## 2020-09-29 PROBLEM — R82.71 GROUP B STREPTOCOCCAL BACTERIURIA: Status: ACTIVE | Noted: 2020-09-29

## 2020-09-29 PROBLEM — O26.899 PELVIC PAIN AFFECTING PREGNANCY (HCC): Status: RESOLVED | Noted: 2019-07-29 | Resolved: 2020-09-29

## 2020-09-29 PROBLEM — O26.899 PELVIC PAIN AFFECTING PREGNANCY: Status: RESOLVED | Noted: 2019-07-29 | Resolved: 2020-09-29

## 2020-09-29 PROBLEM — R10.2 PELVIC PAIN AFFECTING PREGNANCY: Status: RESOLVED | Noted: 2019-07-29 | Resolved: 2020-09-29

## 2020-09-29 PROBLEM — R10.2 PELVIC PAIN AFFECTING PREGNANCY (HCC): Status: RESOLVED | Noted: 2019-07-29 | Resolved: 2020-09-29

## 2020-09-29 NOTE — TELEPHONE ENCOUNTER
----- Message from Edilma Neville CNM sent at 9/29/2020 10:19 AM CDT -----  Pt with GBS in urine. She will need antibiotics in labor. See if she has any UTI symptoms. If not we will just treat in labor.  Rest of labs normal. Thanks SAROJ

## 2020-10-07 ENCOUNTER — TELEPHONE (OUTPATIENT)
Dept: OBGYN CLINIC | Facility: CLINIC | Age: 30
End: 2020-10-07

## 2020-10-30 ENCOUNTER — ROUTINE PRENATAL (OUTPATIENT)
Dept: OBGYN CLINIC | Facility: CLINIC | Age: 30
End: 2020-10-30
Payer: COMMERCIAL

## 2020-10-30 ENCOUNTER — LAB ENCOUNTER (OUTPATIENT)
Dept: LAB | Facility: HOSPITAL | Age: 30
End: 2020-10-30
Attending: ADVANCED PRACTICE MIDWIFE
Payer: COMMERCIAL

## 2020-10-30 VITALS
SYSTOLIC BLOOD PRESSURE: 112 MMHG | DIASTOLIC BLOOD PRESSURE: 70 MMHG | WEIGHT: 154 LBS | BODY MASS INDEX: 27.29 KG/M2 | HEART RATE: 83 BPM | HEIGHT: 63 IN

## 2020-10-30 DIAGNOSIS — Z34.82 PRENATAL CARE, SUBSEQUENT PREGNANCY, SECOND TRIMESTER: ICD-10-CM

## 2020-10-30 DIAGNOSIS — Z34.82 PRENATAL CARE, SUBSEQUENT PREGNANCY, SECOND TRIMESTER: Primary | ICD-10-CM

## 2020-10-30 DIAGNOSIS — Z23 NEED FOR INFLUENZA VACCINATION: ICD-10-CM

## 2020-10-30 PROCEDURE — 36415 COLL VENOUS BLD VENIPUNCTURE: CPT

## 2020-10-30 PROCEDURE — 3008F BODY MASS INDEX DOCD: CPT | Performed by: ADVANCED PRACTICE MIDWIFE

## 2020-10-30 PROCEDURE — 3074F SYST BP LT 130 MM HG: CPT | Performed by: ADVANCED PRACTICE MIDWIFE

## 2020-10-30 PROCEDURE — 82105 ALPHA-FETOPROTEIN SERUM: CPT

## 2020-10-30 PROCEDURE — 81002 URINALYSIS NONAUTO W/O SCOPE: CPT | Performed by: ADVANCED PRACTICE MIDWIFE

## 2020-10-30 PROCEDURE — 90686 IIV4 VACC NO PRSV 0.5 ML IM: CPT | Performed by: ADVANCED PRACTICE MIDWIFE

## 2020-10-30 PROCEDURE — 3078F DIAST BP <80 MM HG: CPT | Performed by: ADVANCED PRACTICE MIDWIFE

## 2020-10-30 PROCEDURE — 90471 IMMUNIZATION ADMIN: CPT | Performed by: ADVANCED PRACTICE MIDWIFE

## 2020-10-30 NOTE — PROGRESS NOTES
? Flutters. Denies pain or bleeding. Has 1st peek and told girl but NIPT said boy. AFP ordered. Flu vaccine today. 20 wk sono ordered and contact info given. Warning signs reviewed.

## 2020-12-07 ENCOUNTER — TELEPHONE (OUTPATIENT)
Dept: OBGYN CLINIC | Facility: CLINIC | Age: 30
End: 2020-12-07

## 2021-01-08 ENCOUNTER — ROUTINE PRENATAL (OUTPATIENT)
Dept: OBGYN CLINIC | Facility: CLINIC | Age: 31
End: 2021-01-08
Payer: COMMERCIAL

## 2021-01-08 VITALS
DIASTOLIC BLOOD PRESSURE: 75 MMHG | SYSTOLIC BLOOD PRESSURE: 123 MMHG | WEIGHT: 167 LBS | HEART RATE: 98 BPM | BODY MASS INDEX: 30 KG/M2

## 2021-01-08 DIAGNOSIS — Z34.82 ENCOUNTER FOR SUPERVISION OF OTHER NORMAL PREGNANCY IN SECOND TRIMESTER: Primary | ICD-10-CM

## 2021-01-08 LAB
APPEARANCE: CLEAR
MULTISTIX LOT#: NORMAL NUMERIC
PH, URINE: 6.5 (ref 4.5–8)
SPECIFIC GRAVITY: 1.02 (ref 1–1.03)
URINE-COLOR: YELLOW
UROBILINOGEN,SEMI-QN: 0.2 MG/DL (ref 0–1.9)

## 2021-01-08 PROCEDURE — 81002 URINALYSIS NONAUTO W/O SCOPE: CPT | Performed by: ADVANCED PRACTICE MIDWIFE

## 2021-01-08 PROCEDURE — 3074F SYST BP LT 130 MM HG: CPT | Performed by: ADVANCED PRACTICE MIDWIFE

## 2021-01-08 PROCEDURE — 3078F DIAST BP <80 MM HG: CPT | Performed by: ADVANCED PRACTICE MIDWIFE

## 2021-01-08 NOTE — PROGRESS NOTES
Sin Carcamo is here for her ANTHONY visit. Currently, she is feeling well. Denies 2nd trimester danger signs.  Will schedule ultrasound    Vital signs and weight reviewed  See flowsheets   Flu shot status: Up to date    Today's Assessment/Plan: Urine C&S repeated (G

## 2021-01-26 ENCOUNTER — TELEPHONE (OUTPATIENT)
Dept: OBGYN CLINIC | Facility: CLINIC | Age: 31
End: 2021-01-26

## 2021-01-26 NOTE — TELEPHONE ENCOUNTER
Please call pt. I got a message from radiology that pt scheduled an anatomy scan with them, but this was from an order I placed in November and she had anatomy scan at [facility].  Per MBW note she is supposed to f/u with MFM for f/u ultrasound d/t limite

## 2021-01-26 NOTE — TELEPHONE ENCOUNTER
Pt advised of MJ's message. Pt given phone number for MFM to schedule appt. Pt agreed and voiced understanding.

## 2021-01-30 ENCOUNTER — LAB ENCOUNTER (OUTPATIENT)
Dept: LAB | Facility: HOSPITAL | Age: 31
End: 2021-01-30
Attending: ADVANCED PRACTICE MIDWIFE
Payer: COMMERCIAL

## 2021-01-30 DIAGNOSIS — Z34.82 ENCOUNTER FOR SUPERVISION OF OTHER NORMAL PREGNANCY IN SECOND TRIMESTER: ICD-10-CM

## 2021-01-30 LAB
DEPRECATED RDW RBC AUTO: 47.9 FL (ref 35.1–46.3)
ERYTHROCYTE [DISTWIDTH] IN BLOOD BY AUTOMATED COUNT: 13.2 % (ref 11–15)
GLUCOSE 1H P GLC SERPL-MCNC: 115 MG/DL
HCT VFR BLD AUTO: 37 %
HGB BLD-MCNC: 12.2 G/DL
MCH RBC QN AUTO: 32.5 PG (ref 26–34)
MCHC RBC AUTO-ENTMCNC: 33 G/DL (ref 31–37)
MCV RBC AUTO: 98.7 FL
PLATELET # BLD AUTO: 183 10(3)UL (ref 150–450)
RBC # BLD AUTO: 3.75 X10(6)UL
WBC # BLD AUTO: 11.5 X10(3) UL (ref 4–11)

## 2021-01-30 PROCEDURE — 36415 COLL VENOUS BLD VENIPUNCTURE: CPT

## 2021-01-30 PROCEDURE — 85027 COMPLETE CBC AUTOMATED: CPT

## 2021-01-30 PROCEDURE — 87389 HIV-1 AG W/HIV-1&-2 AB AG IA: CPT

## 2021-01-30 PROCEDURE — 86780 TREPONEMA PALLIDUM: CPT

## 2021-01-30 PROCEDURE — 82950 GLUCOSE TEST: CPT

## 2021-02-01 LAB — T PALLIDUM AB SER QL: NEGATIVE

## 2021-02-03 ENCOUNTER — TELEPHONE (OUTPATIENT)
Dept: OBGYN CLINIC | Facility: CLINIC | Age: 31
End: 2021-02-03

## 2021-02-03 NOTE — TELEPHONE ENCOUNTER
----- Message from Justin Lancaster CNM sent at 2/3/2021  2:54 PM CST -----  Please notify patient by phone that her 3rd trimester labs were all normal and specifically, her HIV test was negative.

## 2021-02-18 NOTE — PROGRESS NOTES
STANDARD OBSTETRIC ULTRASOUND REPORT   See imaging tab for complete consultation / ultrasound report       Ultrasound Findings:  Single IUP in cephalic presentation. Placenta is anterior. A 3 vessel cord is noted.   Cardiac activity is present at

## 2021-02-19 ENCOUNTER — HOSPITAL ENCOUNTER (OUTPATIENT)
Dept: PERINATAL CARE | Facility: HOSPITAL | Age: 31
Discharge: HOME | End: 2021-02-19
Attending: OBSTETRICS & GYNECOLOGY
Payer: COMMERCIAL

## 2021-02-19 ENCOUNTER — HOSPITAL ENCOUNTER (OUTPATIENT)
Dept: PERINATAL CARE | Facility: HOSPITAL | Age: 31
Discharge: HOME | End: 2021-02-19
Attending: ADVANCED PRACTICE MIDWIFE
Payer: COMMERCIAL

## 2021-02-19 VITALS
SYSTOLIC BLOOD PRESSURE: 122 MMHG | DIASTOLIC BLOOD PRESSURE: 62 MMHG | HEART RATE: 102 BPM | BODY MASS INDEX: 31 KG/M2 | WEIGHT: 177 LBS

## 2021-02-19 DIAGNOSIS — Z15.89 HETEROZYGOUS MTHFR MUTATION C677T: ICD-10-CM

## 2021-02-19 DIAGNOSIS — Z36.89 SCREENING, ANTENATAL, FOR FETAL ANATOMIC SURVEY: ICD-10-CM

## 2021-02-19 DIAGNOSIS — Z36.89 SCREENING, ANTENATAL, FOR FETAL ANATOMIC SURVEY: Primary | ICD-10-CM

## 2021-02-19 PROCEDURE — 76805 OB US >/= 14 WKS SNGL FETUS: CPT | Performed by: OBSTETRICS & GYNECOLOGY

## 2021-02-22 ENCOUNTER — TELEPHONE (OUTPATIENT)
Dept: OBGYN CLINIC | Facility: CLINIC | Age: 31
End: 2021-02-22

## 2021-02-22 NOTE — TELEPHONE ENCOUNTER
Meaghan Holder CNM sent to [facility] Clinical Pool             Please notify patient of normal ultrasound results

## 2021-02-24 ENCOUNTER — LAB ENCOUNTER (OUTPATIENT)
Dept: LAB | Facility: HOSPITAL | Age: 31
End: 2021-02-24
Attending: ADVANCED PRACTICE MIDWIFE
Payer: COMMERCIAL

## 2021-02-24 ENCOUNTER — ROUTINE PRENATAL (OUTPATIENT)
Dept: OBGYN CLINIC | Facility: CLINIC | Age: 31
End: 2021-02-24
Payer: COMMERCIAL

## 2021-02-24 VITALS
DIASTOLIC BLOOD PRESSURE: 79 MMHG | SYSTOLIC BLOOD PRESSURE: 112 MMHG | BODY MASS INDEX: 31.89 KG/M2 | HEIGHT: 63 IN | HEART RATE: 89 BPM | WEIGHT: 180 LBS

## 2021-02-24 DIAGNOSIS — O99.713 PRURITUS OF PREGNANCY IN THIRD TRIMESTER: ICD-10-CM

## 2021-02-24 DIAGNOSIS — L29.9 PRURITUS OF PREGNANCY IN THIRD TRIMESTER: ICD-10-CM

## 2021-02-24 DIAGNOSIS — Z34.83 PRENATAL CARE, SUBSEQUENT PREGNANCY, THIRD TRIMESTER: Primary | ICD-10-CM

## 2021-02-24 LAB
APPEARANCE: CLEAR
MULTISTIX LOT#: 5077 NUMERIC
PH, URINE: 7 (ref 4.5–8)
SPECIFIC GRAVITY: 1.02 (ref 1–1.03)
URINE-COLOR: YELLOW
UROBILINOGEN,SEMI-QN: 0.2 MG/DL (ref 0–1.9)

## 2021-02-24 PROCEDURE — 81002 URINALYSIS NONAUTO W/O SCOPE: CPT | Performed by: ADVANCED PRACTICE MIDWIFE

## 2021-02-24 PROCEDURE — 3008F BODY MASS INDEX DOCD: CPT | Performed by: ADVANCED PRACTICE MIDWIFE

## 2021-02-24 PROCEDURE — 3074F SYST BP LT 130 MM HG: CPT | Performed by: ADVANCED PRACTICE MIDWIFE

## 2021-02-24 PROCEDURE — 82239 BILE ACIDS TOTAL: CPT

## 2021-02-24 PROCEDURE — 3078F DIAST BP <80 MM HG: CPT | Performed by: ADVANCED PRACTICE MIDWIFE

## 2021-02-24 PROCEDURE — 36415 COLL VENOUS BLD VENIPUNCTURE: CPT

## 2021-02-24 NOTE — PROGRESS NOTES
Active fetus  No signs signs of PTL. Reviewed S&S of PTL  Pt reports \"itching on feet & legs\"  No change in soaps, detergent, etc.  Worse at night. Recommended Benadryl in the evening and labs. Warning signs reviewed  All questions answered.

## 2021-02-27 LAB — BILE ACIDS, TOTAL: 2 UMOL/L

## 2021-03-12 ENCOUNTER — ROUTINE PRENATAL (OUTPATIENT)
Dept: OBGYN CLINIC | Facility: CLINIC | Age: 31
End: 2021-03-12
Payer: COMMERCIAL

## 2021-03-12 VITALS
DIASTOLIC BLOOD PRESSURE: 73 MMHG | SYSTOLIC BLOOD PRESSURE: 115 MMHG | BODY MASS INDEX: 32.11 KG/M2 | HEART RATE: 80 BPM | HEIGHT: 63 IN | WEIGHT: 181.25 LBS

## 2021-03-12 DIAGNOSIS — Z34.83 PRENATAL CARE, SUBSEQUENT PREGNANCY, THIRD TRIMESTER: Primary | ICD-10-CM

## 2021-03-12 LAB
APPEARANCE: CLEAR
MULTISTIX LOT#: 5077 NUMERIC
PH, URINE: 6 (ref 4.5–8)
SPECIFIC GRAVITY: 1.02 (ref 1–1.03)
URINE-COLOR: YELLOW
UROBILINOGEN,SEMI-QN: 0.2 MG/DL (ref 0–1.9)

## 2021-03-12 PROCEDURE — 3078F DIAST BP <80 MM HG: CPT | Performed by: ADVANCED PRACTICE MIDWIFE

## 2021-03-12 PROCEDURE — 81002 URINALYSIS NONAUTO W/O SCOPE: CPT | Performed by: ADVANCED PRACTICE MIDWIFE

## 2021-03-12 PROCEDURE — 3008F BODY MASS INDEX DOCD: CPT | Performed by: ADVANCED PRACTICE MIDWIFE

## 2021-03-12 PROCEDURE — 3074F SYST BP LT 130 MM HG: CPT | Performed by: ADVANCED PRACTICE MIDWIFE

## 2021-03-12 NOTE — PROGRESS NOTES
Baby active, no signs PTL. Desires elective IOL to plan childcare etc. Importance of active FM & warning signs reviewed. GBS = in urine will tx positive in labor.  IOL scheduled for 4/9 @ 7 am.

## 2021-03-18 ENCOUNTER — ROUTINE PRENATAL (OUTPATIENT)
Dept: OBGYN CLINIC | Facility: CLINIC | Age: 31
End: 2021-03-18
Payer: COMMERCIAL

## 2021-03-18 VITALS
DIASTOLIC BLOOD PRESSURE: 80 MMHG | SYSTOLIC BLOOD PRESSURE: 125 MMHG | WEIGHT: 184 LBS | BODY MASS INDEX: 33 KG/M2 | HEART RATE: 102 BPM

## 2021-03-18 DIAGNOSIS — Z34.83 ENCOUNTER FOR SUPERVISION OF OTHER NORMAL PREGNANCY IN THIRD TRIMESTER: Primary | ICD-10-CM

## 2021-03-18 LAB
APPEARANCE: CLEAR
GLUCOSE (URINE DIPSTICK): 2000 MG/DL
MULTISTIX LOT#: 5077 NUMERIC
PH, URINE: 6.5 (ref 4.5–8)
SPECIFIC GRAVITY: 1.02 (ref 1–1.03)
URINE-COLOR: YELLOW
UROBILINOGEN,SEMI-QN: 1 MG/DL (ref 0–1.9)

## 2021-03-18 PROCEDURE — 3074F SYST BP LT 130 MM HG: CPT | Performed by: ADVANCED PRACTICE MIDWIFE

## 2021-03-18 PROCEDURE — 3079F DIAST BP 80-89 MM HG: CPT | Performed by: ADVANCED PRACTICE MIDWIFE

## 2021-03-18 PROCEDURE — 81002 URINALYSIS NONAUTO W/O SCOPE: CPT | Performed by: ADVANCED PRACTICE MIDWIFE

## 2021-03-18 NOTE — PROGRESS NOTES
Feeling well. Discussed glucosuria. Patient had two buttered croissants and a sweet coffee prior to coming. Also states she has not had much coffee. Encouraged increased hydration. Endorses regular fetal movement.  Denies contractions, LOF, vaginal bleeding

## 2021-03-26 ENCOUNTER — ROUTINE PRENATAL (OUTPATIENT)
Dept: OBGYN CLINIC | Facility: CLINIC | Age: 31
End: 2021-03-26
Payer: COMMERCIAL

## 2021-03-26 VITALS
HEART RATE: 91 BPM | SYSTOLIC BLOOD PRESSURE: 111 MMHG | WEIGHT: 183.38 LBS | BODY MASS INDEX: 32 KG/M2 | DIASTOLIC BLOOD PRESSURE: 72 MMHG

## 2021-03-26 DIAGNOSIS — Z34.91 ENCOUNTER FOR SUPERVISION OF NORMAL PREGNANCY IN FIRST TRIMESTER, UNSPECIFIED GRAVIDITY: Primary | ICD-10-CM

## 2021-03-26 LAB
APPEARANCE: CLEAR
MULTISTIX LOT#: 5077 NUMERIC
PH, URINE: 7 (ref 4.5–8)
SPECIFIC GRAVITY: 1.01 (ref 1–1.03)
URINE-COLOR: YELLOW

## 2021-03-26 PROCEDURE — 81002 URINALYSIS NONAUTO W/O SCOPE: CPT | Performed by: ADVANCED PRACTICE MIDWIFE

## 2021-03-26 PROCEDURE — 3074F SYST BP LT 130 MM HG: CPT | Performed by: ADVANCED PRACTICE MIDWIFE

## 2021-03-26 PROCEDURE — 3078F DIAST BP <80 MM HG: CPT | Performed by: ADVANCED PRACTICE MIDWIFE

## 2021-03-26 NOTE — PROGRESS NOTES
Baby active, some nausea no vomiting, increasing pressure. Desires SVE. Cervix 2cm. Hoping to have AROM for IOL method and waterbirth. Waterbirth with last baby.  Is + GBS, advised will 1st get dose of antibiotics and then at 4 hrs evaluate if appropriate f

## 2021-04-01 ENCOUNTER — ROUTINE PRENATAL (OUTPATIENT)
Dept: OBGYN CLINIC | Facility: CLINIC | Age: 31
End: 2021-04-01
Payer: COMMERCIAL

## 2021-04-01 VITALS
HEIGHT: 63 IN | WEIGHT: 185 LBS | BODY MASS INDEX: 32.78 KG/M2 | HEART RATE: 101 BPM | SYSTOLIC BLOOD PRESSURE: 128 MMHG | DIASTOLIC BLOOD PRESSURE: 80 MMHG

## 2021-04-01 DIAGNOSIS — Z34.83 PRENATAL CARE, SUBSEQUENT PREGNANCY, THIRD TRIMESTER: Primary | ICD-10-CM

## 2021-04-01 PROCEDURE — 3008F BODY MASS INDEX DOCD: CPT | Performed by: ADVANCED PRACTICE MIDWIFE

## 2021-04-01 PROCEDURE — 81002 URINALYSIS NONAUTO W/O SCOPE: CPT | Performed by: ADVANCED PRACTICE MIDWIFE

## 2021-04-01 PROCEDURE — 3074F SYST BP LT 130 MM HG: CPT | Performed by: ADVANCED PRACTICE MIDWIFE

## 2021-04-01 PROCEDURE — 3079F DIAST BP 80-89 MM HG: CPT | Performed by: ADVANCED PRACTICE MIDWIFE

## 2021-04-06 ENCOUNTER — LAB ENCOUNTER (OUTPATIENT)
Dept: LAB | Facility: HOSPITAL | Age: 31
End: 2021-04-06
Attending: ADVANCED PRACTICE MIDWIFE
Payer: COMMERCIAL

## 2021-04-06 DIAGNOSIS — Z34.91 ENCOUNTER FOR SUPERVISION OF NORMAL PREGNANCY IN FIRST TRIMESTER, UNSPECIFIED GRAVIDITY: ICD-10-CM

## 2021-04-07 ENCOUNTER — TELEPHONE (OUTPATIENT)
Dept: OBGYN UNIT | Facility: HOSPITAL | Age: 31
End: 2021-04-07

## 2021-04-09 ENCOUNTER — HOSPITAL ENCOUNTER (INPATIENT)
Facility: HOSPITAL | Age: 31
LOS: 1 days | Discharge: HOME | End: 2021-04-10
Attending: ADVANCED PRACTICE MIDWIFE | Admitting: OBSTETRICS & GYNECOLOGY
Payer: COMMERCIAL

## 2021-04-09 ENCOUNTER — APPOINTMENT (OUTPATIENT)
Dept: OBGYN CLINIC | Facility: HOSPITAL | Age: 31
End: 2021-04-09
Attending: ADVANCED PRACTICE MIDWIFE
Payer: COMMERCIAL

## 2021-04-09 PROBLEM — Z34.90 SUPERVISION OF NORMAL PREGNANCY: Status: ACTIVE | Noted: 2021-04-09

## 2021-04-09 PROBLEM — Z34.90 SUPERVISION OF NORMAL PREGNANCY (HCC): Status: ACTIVE | Noted: 2021-04-09

## 2021-04-09 PROCEDURE — 59400 OBSTETRICAL CARE: CPT | Performed by: ADVANCED PRACTICE MIDWIFE

## 2021-04-09 PROCEDURE — 10907ZC DRAINAGE OF AMNIOTIC FLUID, THERAPEUTIC FROM PRODUCTS OF CONCEPTION, VIA NATURAL OR ARTIFICIAL OPENING: ICD-10-PCS | Performed by: ADVANCED PRACTICE MIDWIFE

## 2021-04-09 RX ORDER — SODIUM CHLORIDE, SODIUM LACTATE, POTASSIUM CHLORIDE, CALCIUM CHLORIDE 600; 310; 30; 20 MG/100ML; MG/100ML; MG/100ML; MG/100ML
INJECTION, SOLUTION INTRAVENOUS CONTINUOUS
Status: DISCONTINUED | OUTPATIENT
Start: 2021-04-09 | End: 2021-04-09

## 2021-04-09 RX ORDER — ACETAMINOPHEN 500 MG
500 TABLET ORAL EVERY 6 HOURS PRN
Status: DISCONTINUED | OUTPATIENT
Start: 2021-04-09 | End: 2021-04-09 | Stop reason: HOSPADM

## 2021-04-09 RX ORDER — DOCUSATE SODIUM 100 MG/1
100 CAPSULE, LIQUID FILLED ORAL
Status: DISCONTINUED | OUTPATIENT
Start: 2021-04-09 | End: 2021-04-10

## 2021-04-09 RX ORDER — ACETAMINOPHEN 325 MG/1
650 TABLET ORAL EVERY 6 HOURS PRN
Status: DISCONTINUED | OUTPATIENT
Start: 2021-04-09 | End: 2021-04-10

## 2021-04-09 RX ORDER — SIMETHICONE 80 MG
80 TABLET,CHEWABLE ORAL 3 TIMES DAILY PRN
Status: DISCONTINUED | OUTPATIENT
Start: 2021-04-09 | End: 2021-04-10

## 2021-04-09 RX ORDER — LIDOCAINE HYDROCHLORIDE 10 MG/ML
30 INJECTION, SOLUTION EPIDURAL; INFILTRATION; INTRACAUDAL; PERINEURAL ONCE
Status: DISCONTINUED | OUTPATIENT
Start: 2021-04-09 | End: 2021-04-09 | Stop reason: HOSPADM

## 2021-04-09 RX ORDER — ONDANSETRON 2 MG/ML
4 INJECTION INTRAMUSCULAR; INTRAVENOUS EVERY 6 HOURS PRN
Status: DISCONTINUED | OUTPATIENT
Start: 2021-04-09 | End: 2021-04-10

## 2021-04-09 RX ORDER — IBUPROFEN 600 MG/1
600 TABLET ORAL EVERY 6 HOURS
Status: DISCONTINUED | OUTPATIENT
Start: 2021-04-09 | End: 2021-04-10

## 2021-04-09 RX ORDER — IBUPROFEN 600 MG/1
600 TABLET ORAL EVERY 6 HOURS PRN
Status: DISCONTINUED | OUTPATIENT
Start: 2021-04-09 | End: 2021-04-09 | Stop reason: HOSPADM

## 2021-04-09 RX ORDER — AMMONIA INHALANTS 0.04 G/.3ML
0.3 INHALANT RESPIRATORY (INHALATION) AS NEEDED
Status: DISCONTINUED | OUTPATIENT
Start: 2021-04-09 | End: 2021-04-09 | Stop reason: HOSPADM

## 2021-04-09 RX ORDER — DIAPER,BRIEF,INFANT-TODD,DISP
1 EACH MISCELLANEOUS EVERY 6 HOURS PRN
Status: DISCONTINUED | OUTPATIENT
Start: 2021-04-09 | End: 2021-04-10

## 2021-04-09 RX ORDER — ONDANSETRON 2 MG/ML
4 INJECTION INTRAMUSCULAR; INTRAVENOUS EVERY 6 HOURS PRN
Status: DISCONTINUED | OUTPATIENT
Start: 2021-04-09 | End: 2021-04-09

## 2021-04-09 RX ORDER — TRISODIUM CITRATE DIHYDRATE AND CITRIC ACID MONOHYDRATE 500; 334 MG/5ML; MG/5ML
30 SOLUTION ORAL AS NEEDED
Status: DISCONTINUED | OUTPATIENT
Start: 2021-04-09 | End: 2021-04-09 | Stop reason: HOSPADM

## 2021-04-09 RX ORDER — TERBUTALINE SULFATE 1 MG/ML
0.25 INJECTION, SOLUTION SUBCUTANEOUS AS NEEDED
Status: DISCONTINUED | OUTPATIENT
Start: 2021-04-09 | End: 2021-04-09 | Stop reason: HOSPADM

## 2021-04-09 RX ORDER — BISACODYL 10 MG
10 SUPPOSITORY, RECTAL RECTAL ONCE AS NEEDED
Status: DISCONTINUED | OUTPATIENT
Start: 2021-04-09 | End: 2021-04-10

## 2021-04-09 RX ORDER — AMMONIA INHALANTS 0.04 G/.3ML
0.3 INHALANT RESPIRATORY (INHALATION) AS NEEDED
Status: DISCONTINUED | OUTPATIENT
Start: 2021-04-09 | End: 2021-04-10

## 2021-04-09 NOTE — PLAN OF CARE
Problem: BIRTH - VAGINAL/ SECTION  Goal: Fetal and maternal status remain reassuring during the birth process  Description: INTERVENTIONS:  - Monitor vital signs  - Monitor fetal heart rate  - Monitor uterine activity  - Monitor labor progression Comfort and Pain Control     Interventions:   - Non Pharmacological pain intervention   - IV/IM and Epidural pain medication per Provider order and patient request  - Education  - Involve Patient in POC   - See additional Care Plan goals for specific inter

## 2021-04-09 NOTE — L&D DELIVERY NOTE
John Peralta [[id number]]    Labor Events    Rupture date/time: 2021 1415     Rupture type: AROM  Fluid color: Clear      Presentation    Presentation: Vertex     Operative Delivery    No data filed      Shoulder Dystocia    No data filed      An

## 2021-04-09 NOTE — H&P
[facility]:  [facility]    1990 MRN [id number]   Location [address]  Attending Desiree  Day # 0 Admitting Taylor Pham MD     Da Social History:   Past Surgical History:   Procedure Laterality Date   •      • OTHER SURGICAL HISTORY  10/2015    wisdom teeth removed, no complications     Family History:   Family History   Problem Relation Age of Onset   • Lipids Mother    • Hy Genitalia:  No lesions  Extremities: extremities normal, atraumatic, no cyanosis or edema  Musculoskeletal: steady gait  Reflexes: +1/+1    Results:     Prenatal Results     Initial     Test Value Reference Range Date Time    Blood Type (ABO Group)  O   04 Negative 09/25/20 1439          24-28 Weeks     Test Value Reference Range Date Time    HCT  37.0 % 35.0 - 48.0 01/30/21 0804    HGB  12.2 g/dL 12.0 - 16.0 01/30/21 0804    Platelets  052.7 26(1).0 - 450.0 01/30/21 0804    GTT 1 Hr  115 mg/dL <=139 0 Risks, benefits, alternatives and possible complications have been discussed in detail with the patient. Pre-admission, admission, and post admission procedures and expectations were discussed in detail.     All questions answered, all appropriate con

## 2021-04-09 NOTE — PROGRESS NOTES
[facility]    Labor Progress Note    Rahul Cui Patient Status:  Inpatient    1990 MRN [id number]   Location [address] [facility] Attending Ana Rossi, [address] Day # 0 PCP Earnest Pablo, DO     HPI/Subje Assessment & Plan:     Supervision of normal pregnancy      Heterozygous MTHFR mutation C677T (HCC)   Group B streptococcal bacteriuria  Adequate prophylaxis, AROM clear fluid. Recommend ambulation.        Plan discussed with patient who verbalizes un

## 2021-04-10 VITALS
DIASTOLIC BLOOD PRESSURE: 63 MMHG | SYSTOLIC BLOOD PRESSURE: 114 MMHG | HEIGHT: 63 IN | TEMPERATURE: 98 F | HEART RATE: 77 BPM | WEIGHT: 185 LBS | BODY MASS INDEX: 32.78 KG/M2 | RESPIRATION RATE: 14 BRPM

## 2021-04-10 NOTE — DISCHARGE SUMMARY
[place] [facility]    Discharge Summary    Bessy Govea Patient Status:  Inpatient    1990 MRN [id number]   Location [facility] 3SE Attending Praveen Long, [address] Day # 1       Delivering OB Clinician: Isaura Batres

## 2021-04-10 NOTE — PLAN OF CARE
Sat with patient to review plan of care. Discussed analgesic options and answered all questions. VSS. Breastfeeding on demand. Breastfeeding successfully. Voiding independently. Lochia is WNL. Uterus is firm.      Problem: PAIN - ADULT  Goal: Verbalizes/dis Education  - Involve Patient in [address]   - See additional Care Plan goals for specific interventions       Outcome: Progressing     Problem: Patient Centered Care  Goal: Patient preferences are identified and integrated in the patient's plan of care  Descript systems available to mother/family.  - Identify cultural beliefs/practices regarding lactation, letdown techniques, maternal food preferences.   - Assess mother's knowledge and previous experience with breast feeding.  - Provide information as needed about emotional status and coping mechanisms. - Encourage caregiver to participate in  daily care. - Assess support systems available to mother/family.  - Provide /case management support as needed.   Outcome: Progressing

## 2021-04-10 NOTE — PROGRESS NOTES
[place] [facility] - [facility]    OB/GYNE Progress Note      Cuca Kee Patient Status:  Inpatient    1990 MRN [id number]   Location [facility] 3SE Attending Claire Cadet CNM   Hosp Day # 1 PCP Cathie Reyes, DO        Assessment/Plan evidence of DVT on exam  Psychiatric: Calm affect, appropriate     DVT Risk Score: Low risk        Results:     Lab Results   Component Value Date    TREPONEMALAB Negative 01/30/2021    ABO O 04/09/2021    RH Positive 04/09/2021    WBC 13.4 (H) 04/09/2021

## 2021-04-10 NOTE — LACTATION NOTE
LACTATION NOTE - MOTHER      Evaluation Type: Inpatient    Problems identified  Problems identified: Knowledge deficit;Milk supply WNL    Maternal history  Maternal history: Induction of labor  Other/comment: history of MTHFR mutation    Breastfeeding goal

## 2021-04-11 NOTE — PROGRESS NOTES
Discharge order received from MD. Mom in stable condition. Mom and baby ID band removed and numbers verified against eachother. Discharge instructions given regarding pelvic rest, preeclampsia signs and symptoms, MD followup, and pain medications.  Mom tuan

## 2021-04-16 ENCOUNTER — TELEPHONE (OUTPATIENT)
Dept: OBGYN UNIT | Facility: HOSPITAL | Age: 31
End: 2021-04-16

## 2021-04-27 ENCOUNTER — TELEMEDICINE (OUTPATIENT)
Dept: OBGYN CLINIC | Facility: CLINIC | Age: 31
End: 2021-04-27

## 2021-04-27 NOTE — PROGRESS NOTES
Subjective: Josiah Walker is 2 weeks postpartum after a vaginal delivery of a baby boy. Presents for virtual/video visit. See L&D delivery summary for birth statistics. Bleeding is decreasing and not experiencing any excessive pain.     Having difficulty with f learning identified. This visit is conducted using Telemedicine with live, interactive video and audio. Patient has been referred to the [facility] website at [email or web address] to review the yearly Consent to Treat document.     Patient understands a

## 2021-04-28 ENCOUNTER — TELEPHONE (OUTPATIENT)
Dept: OBGYN CLINIC | Facility: CLINIC | Age: 31
End: 2021-04-28

## 2021-04-28 NOTE — TELEPHONE ENCOUNTER
Pt experiencing PP depression. Had video visit so not able to do an [place]. Can we still send referral to Lucille Adorno? If so please send. Otherwise please place behavioral health nurse navigator referral for them to reach out to her.  Thanks MJ

## 2021-05-06 ENCOUNTER — TELEPHONE (OUTPATIENT)
Dept: OBGYN CLINIC | Facility: CLINIC | Age: 31
End: 2021-05-06

## 2021-05-28 ENCOUNTER — TELEPHONE (OUTPATIENT)
Dept: OBGYN CLINIC | Facility: CLINIC | Age: 31
End: 2021-05-28

## 2021-05-28 NOTE — TELEPHONE ENCOUNTER
Patient no longer has her insurance coverage. She couldn't make her 5/28 appointment and still needs to be seen for her post partum visit. On June 1st she will be covered by [facility] which we do not accept. Please advise.

## 2021-06-01 NOTE — TELEPHONE ENCOUNTER
Spoke with pt and advised she can call billing and find out if postpartum visit will be covered under her global care for pregnancy. Advised other option would be to be self pay for visit. Pt given contact info for billing.  Pt agreed and voiced understandi

## 2021-08-13 ENCOUNTER — OFFICE VISIT (OUTPATIENT)
Dept: OBGYN CLINIC | Facility: CLINIC | Age: 31
End: 2021-08-13
Payer: COMMERCIAL

## 2021-08-13 VITALS
HEART RATE: 71 BPM | WEIGHT: 163 LBS | BODY MASS INDEX: 30 KG/M2 | DIASTOLIC BLOOD PRESSURE: 71 MMHG | HEIGHT: 62 IN | SYSTOLIC BLOOD PRESSURE: 107 MMHG

## 2021-08-13 DIAGNOSIS — R62.51 POOR WEIGHT GAIN IN INFANT: ICD-10-CM

## 2021-08-13 DIAGNOSIS — Z12.4 SCREENING FOR CERVICAL CANCER: ICD-10-CM

## 2021-08-13 DIAGNOSIS — M62.08 DIASTASIS RECTI: ICD-10-CM

## 2021-08-13 DIAGNOSIS — Z11.3 SCREEN FOR STD (SEXUALLY TRANSMITTED DISEASE): ICD-10-CM

## 2021-08-13 DIAGNOSIS — Z30.09 FAMILY PLANNING: Primary | ICD-10-CM

## 2021-08-13 PROCEDURE — 3078F DIAST BP <80 MM HG: CPT | Performed by: ADVANCED PRACTICE MIDWIFE

## 2021-08-13 PROCEDURE — 3074F SYST BP LT 130 MM HG: CPT | Performed by: ADVANCED PRACTICE MIDWIFE

## 2021-08-13 PROCEDURE — 3008F BODY MASS INDEX DOCD: CPT | Performed by: ADVANCED PRACTICE MIDWIFE

## 2021-08-13 PROCEDURE — 99395 PREV VISIT EST AGE 18-39: CPT | Performed by: ADVANCED PRACTICE MIDWIFE

## 2021-08-13 NOTE — PROGRESS NOTES
Christofer Hernandez is a 31year old female. HPI:             Christofer Hernandez is a 31year old female. [id number] No LMP recorded.     Patient presents with:  Gyn Exam: last pap 01/08/2018 normal. would like to discuss birth control        Hx of STD's: No  Last daily. (Patient not taking: Reported on 8/13/2021 )         Allergies:  No Known Allergies      ROS:   Review of Systems   Constitutional: Negative. Respiratory: Negative. Cardiovascular: Negative. Gastrointestinal: Negative.     Genitourinary: Neg Lymphadenopathy:      Lower Body: No right inguinal adenopathy. No left inguinal adenopathy. Neurological:      Mental Status: She is alert and oriented to person, place, and time.    Psychiatric:         Speech: Speech normal.         Behavior: Vanessa Gar

## 2021-08-16 LAB
C TRACH DNA SPEC QL NAA+PROBE: NEGATIVE
N GONORRHOEA DNA SPEC QL NAA+PROBE: NEGATIVE

## 2021-08-18 ENCOUNTER — TELEPHONE (OUTPATIENT)
Dept: OBGYN CLINIC | Facility: CLINIC | Age: 31
End: 2021-08-18

## 2021-08-18 NOTE — TELEPHONE ENCOUNTER
----- Message from Leroy Bradley CNM sent at 8/18/2021  3:47 PM CDT -----  Please call cultures are negative

## 2021-08-18 NOTE — TELEPHONE ENCOUNTER
Notified pt of results & instructions per MES. Pt req to schedule paragard IUD insertion. Referral open status. appt scheduled, message sent to managed care to review & pt instructed to call office on Monday to confirm the referral was authorized.  Pt farnaz

## 2021-08-19 ENCOUNTER — NURSE ONLY (OUTPATIENT)
Dept: LACTATION | Facility: HOSPITAL | Age: 31
End: 2021-08-19
Attending: ADVANCED PRACTICE MIDWIFE
Payer: COMMERCIAL

## 2021-08-19 DIAGNOSIS — O92.79 DISORDER OF LACTATION, POSTPARTUM CONDITION OR COMPLICATION: Primary | ICD-10-CM

## 2021-08-19 PROCEDURE — 99213 OFFICE O/P EST LOW 20 MIN: CPT

## 2021-08-20 NOTE — PROGRESS NOTES
Situation/Background: Infant went from being in the 40th percentile for weight, to the 13th in 9 weeks. Gaining slightly less weight than expected per week. Mother states that she has begun adding 2 additional feedings per day.      Assessment: Infant oral

## 2021-08-20 NOTE — PATIENT INSTRUCTIONS
Breastfeeding suggestions to increase milk supply      Full evaluation of your current milk supply and your infant’s transfer of breastmilk is important prior to using galactogues.     Review of your history and treatment of any medical conditions by 24 hours. Use breastfeeding journal.  • Weight gain of at least 4-7 ounces per week for the first 3 months.     If expressed breast milk or formula supplements are needed:  • Consider using Supplemental Nursing System (SNS) at the breast and offer your own no

## 2021-08-24 ENCOUNTER — OFFICE VISIT (OUTPATIENT)
Dept: OBGYN CLINIC | Facility: CLINIC | Age: 31
End: 2021-08-24
Payer: COMMERCIAL

## 2021-08-24 VITALS — SYSTOLIC BLOOD PRESSURE: 119 MMHG | HEART RATE: 62 BPM | DIASTOLIC BLOOD PRESSURE: 81 MMHG

## 2021-08-24 DIAGNOSIS — Z32.02 PREGNANCY EXAMINATION OR TEST, NEGATIVE RESULT: ICD-10-CM

## 2021-08-24 DIAGNOSIS — Z30.430 ENCOUNTER FOR INSERTION OF INTRAUTERINE CONTRACEPTIVE DEVICE: Primary | ICD-10-CM

## 2021-08-24 LAB
CONTROL LINE PRESENT WITH A CLEAR BACKGROUND (YES/NO): YES YES/NO
PREGNANCY TEST, URINE: NEGATIVE

## 2021-08-24 PROCEDURE — 3079F DIAST BP 80-89 MM HG: CPT | Performed by: ADVANCED PRACTICE MIDWIFE

## 2021-08-24 PROCEDURE — 81025 URINE PREGNANCY TEST: CPT | Performed by: ADVANCED PRACTICE MIDWIFE

## 2021-08-24 PROCEDURE — 58300 INSERT INTRAUTERINE DEVICE: CPT | Performed by: ADVANCED PRACTICE MIDWIFE

## 2021-08-24 PROCEDURE — 3074F SYST BP LT 130 MM HG: CPT | Performed by: ADVANCED PRACTICE MIDWIFE

## 2021-08-24 RX ORDER — COPPER 313.4 MG/1
1 INTRAUTERINE DEVICE INTRAUTERINE ONCE
Status: COMPLETED | OUTPATIENT
Start: 2021-08-24 | End: 2021-08-24

## 2021-08-24 RX ADMIN — COPPER 1 DEVICE: 313.4 INTRAUTERINE DEVICE INTRAUTERINE at 14:18:00

## 2021-08-24 NOTE — PROCEDURES
IUD Insertion     Pregnancy Results: negative from urine test   Birth control method(s) used:  ; date last used:   None    Consent signed.   Procedure discussed with the patient in detail including indication, risks, benefits, alternatives and complications

## 2021-08-26 ENCOUNTER — TELEPHONE (OUTPATIENT)
Dept: LACTATION | Facility: HOSPITAL | Age: 31
End: 2021-08-26

## 2021-08-26 NOTE — TELEPHONE ENCOUNTER
Mother of infant states that breastfeeding is going well. Encouraged to call [facility] office if questions or concerns arise.

## 2021-09-08 ENCOUNTER — OFFICE VISIT (OUTPATIENT)
Dept: OBGYN CLINIC | Facility: CLINIC | Age: 31
End: 2021-09-08
Payer: COMMERCIAL

## 2021-09-08 PROBLEM — F43.21 GRIEVING: Status: ACTIVE | Noted: 2021-09-08

## 2021-09-08 PROCEDURE — 99212 OFFICE O/P EST SF 10 MIN: CPT | Performed by: ADVANCED PRACTICE MIDWIFE

## 2021-09-08 NOTE — PROGRESS NOTES
Miriam Ariza was brought to office today by her  who reports that their 9-month old baby  yesterday while napping in the [facility].  He was at work and received a call from Miriam Ariza who was hysterical. Reports paramedics worked on the baby for a while but to

## 2021-09-15 ENCOUNTER — TELEPHONE (OUTPATIENT)
Dept: OBGYN CLINIC | Facility: CLINIC | Age: 31
End: 2021-09-15

## 2021-09-15 NOTE — TELEPHONE ENCOUNTER
Telephoned patient to check in on her because I noticed that she did not have a follow-up appointment. She reported that she is doing much better. Wake and  were yesterday.  She did state that her 's PCP had prescribed some Xanax for him and s

## 2021-10-19 NOTE — PROGRESS NOTES
Phone OB RN education visit completed, educational material reviewed. Pt verbalized understanding. Orders placed for NOB labs including HCV.  Pt states her last Pap smear was in 01/2018 and was normal. Pt desires FTS, order placed and pt advised to f/u with
Pt picked up OB Packet and completed EPDS and ALICIA-7. EPDS scored 3, ALICIA-& and scored 0.
Yes

## 2021-12-21 ENCOUNTER — TELEPHONE (OUTPATIENT)
Dept: OBGYN CLINIC | Facility: CLINIC | Age: 31
End: 2021-12-21

## 2021-12-21 ENCOUNTER — HOSPITAL ENCOUNTER (OUTPATIENT)
Dept: ULTRASOUND IMAGING | Facility: HOSPITAL | Age: 31
Discharge: HOME | End: 2021-12-21
Attending: ADVANCED PRACTICE MIDWIFE
Payer: MEDICAID

## 2021-12-21 ENCOUNTER — OFFICE VISIT (OUTPATIENT)
Dept: OBGYN CLINIC | Facility: CLINIC | Age: 31
End: 2021-12-21
Payer: COMMERCIAL

## 2021-12-21 ENCOUNTER — LAB ENCOUNTER (OUTPATIENT)
Dept: LAB | Facility: HOSPITAL | Age: 31
End: 2021-12-21
Attending: ADVANCED PRACTICE MIDWIFE
Payer: MEDICAID

## 2021-12-21 VITALS — WEIGHT: 158.81 LBS | BODY MASS INDEX: 29 KG/M2

## 2021-12-21 DIAGNOSIS — Z84.89 HISTORY OF DEATH OF CHILD AFTER NEWBORN PERIOD: ICD-10-CM

## 2021-12-21 DIAGNOSIS — Z30.09 BIRTH CONTROL COUNSELING: ICD-10-CM

## 2021-12-21 DIAGNOSIS — F43.29 PROLONGED GRIEF REACTION: ICD-10-CM

## 2021-12-21 DIAGNOSIS — T83.32XA INTRAUTERINE CONTRACEPTIVE DEVICE THREADS LOST, INITIAL ENCOUNTER: ICD-10-CM

## 2021-12-21 DIAGNOSIS — T83.32XA INTRAUTERINE CONTRACEPTIVE DEVICE THREADS LOST, INITIAL ENCOUNTER: Primary | ICD-10-CM

## 2021-12-21 PROCEDURE — 76830 TRANSVAGINAL US NON-OB: CPT | Performed by: ADVANCED PRACTICE MIDWIFE

## 2021-12-21 PROCEDURE — 81025 URINE PREGNANCY TEST: CPT | Performed by: ADVANCED PRACTICE MIDWIFE

## 2021-12-21 PROCEDURE — 76856 US EXAM PELVIC COMPLETE: CPT | Performed by: ADVANCED PRACTICE MIDWIFE

## 2021-12-21 PROCEDURE — 36415 COLL VENOUS BLD VENIPUNCTURE: CPT

## 2021-12-21 PROCEDURE — 99213 OFFICE O/P EST LOW 20 MIN: CPT | Performed by: ADVANCED PRACTICE MIDWIFE

## 2021-12-21 PROCEDURE — 84702 CHORIONIC GONADOTROPIN TEST: CPT

## 2021-12-21 RX ORDER — SERTRALINE HYDROCHLORIDE 25 MG/1
25 TABLET, FILM COATED ORAL DAILY
Qty: 30 TABLET | Refills: 11 | Status: SHIPPED | OUTPATIENT
Start: 2021-12-21

## 2021-12-21 NOTE — TELEPHONE ENCOUNTER
----- Message from Soledad Mitchell CNM sent at 12/21/2021  3:48 PM CST -----  Negative pregnancy test

## 2021-12-22 DIAGNOSIS — T83.32XA INTRAUTERINE CONTRACEPTIVE DEVICE THREADS LOST, INITIAL ENCOUNTER: Primary | ICD-10-CM

## 2021-12-23 PROBLEM — F43.29 PROLONGED GRIEF REACTION: Status: ACTIVE | Noted: 2021-12-23

## 2021-12-23 PROBLEM — T83.32XA IUD THREADS LOST: Status: ACTIVE | Noted: 2021-12-23

## 2021-12-23 PROBLEM — F43.81 PROLONGED GRIEF REACTION: Status: ACTIVE | Noted: 2021-12-23

## 2021-12-23 NOTE — PROGRESS NOTES
Subjective:   Patient ID: Yoan Pulido is a 36year old female who presents for an IUD string check. Pt reports she has been unable to feel her IUD strings. Pt also expressed prolonged grief after  death  Requesting medication and therapy.   Sandy Hercules (primary encounter diagnosis)  Birth control counseling  Prolonged grief reaction  History of death of child after  period  Pelvic ultrasound today  Back-up contraception recommended   Instructions for use of Zoloft  Referral for therapy initiated

## 2022-01-20 ENCOUNTER — HOSPITAL ENCOUNTER (OUTPATIENT)
Dept: GENERAL RADIOLOGY | Age: 32
Discharge: HOME | End: 2022-01-20
Attending: ADVANCED PRACTICE MIDWIFE
Payer: MEDICAID

## 2022-01-20 DIAGNOSIS — T83.32XA INTRAUTERINE CONTRACEPTIVE DEVICE THREADS LOST, INITIAL ENCOUNTER: ICD-10-CM

## 2022-01-20 PROCEDURE — 74018 RADEX ABDOMEN 1 VIEW: CPT | Performed by: ADVANCED PRACTICE MIDWIFE

## 2022-01-24 ENCOUNTER — TELEPHONE (OUTPATIENT)
Dept: OBGYN CLINIC | Facility: CLINIC | Age: 32
End: 2022-01-24

## 2022-01-24 NOTE — TELEPHONE ENCOUNTER
----- Message from Zara Vital CNM sent at 1/24/2022  3:25 PM CST -----  No IUD seen in the abdomen on xray  WE can  assume it fell out.   Does she want to come in for an appointment for contraception

## 2022-01-26 NOTE — TELEPHONE ENCOUNTER
Not[place] pt of results & instructions [place] [facility] states she is contemplating having another baby after the loss of her last baby. Condolences extended. Pt states she is in a better place now. Pt now has [employer].  Advised pt it is accepted at

## 2022-02-25 ENCOUNTER — OFFICE VISIT (OUTPATIENT)
Dept: FAMILY MEDICINE | Age: 32
End: 2022-02-25

## 2022-02-25 VITALS
SYSTOLIC BLOOD PRESSURE: 112 MMHG | HEIGHT: 62 IN | HEART RATE: 66 BPM | WEIGHT: 158.73 LBS | BODY MASS INDEX: 29.21 KG/M2 | TEMPERATURE: 98.3 F | DIASTOLIC BLOOD PRESSURE: 66 MMHG

## 2022-02-25 DIAGNOSIS — D18.01 CHERRY HEMANGIOMA: ICD-10-CM

## 2022-02-25 DIAGNOSIS — Z76.89 ENCOUNTER TO ESTABLISH CARE: Primary | ICD-10-CM

## 2022-02-25 PROCEDURE — 99202 OFFICE O/P NEW SF 15 MIN: CPT | Performed by: FAMILY MEDICINE

## 2022-02-25 RX ORDER — SERTRALINE HYDROCHLORIDE 25 MG/1
25 TABLET, FILM COATED ORAL DAILY
COMMUNITY
Start: 2021-12-21 | End: 2022-02-25

## 2022-02-25 ASSESSMENT — PATIENT HEALTH QUESTIONNAIRE - PHQ9
CLINICAL INTERPRETATION OF PHQ2 SCORE: NO FURTHER SCREENING NEEDED
2. FEELING DOWN, DEPRESSED OR HOPELESS: NOT AT ALL
1. LITTLE INTEREST OR PLEASURE IN DOING THINGS: NOT AT ALL
SUM OF ALL RESPONSES TO PHQ9 QUESTIONS 1 AND 2: 0
SUM OF ALL RESPONSES TO PHQ9 QUESTIONS 1 AND 2: 0

## 2022-03-11 ENCOUNTER — OFFICE VISIT (OUTPATIENT)
Dept: FAMILY MEDICINE | Age: 32
End: 2022-03-11

## 2022-03-11 VITALS
SYSTOLIC BLOOD PRESSURE: 114 MMHG | TEMPERATURE: 97.2 F | HEIGHT: 62 IN | WEIGHT: 158.73 LBS | BODY MASS INDEX: 29.21 KG/M2 | DIASTOLIC BLOOD PRESSURE: 66 MMHG | HEART RATE: 85 BPM

## 2022-03-11 DIAGNOSIS — Z13.220 LIPID SCREENING: ICD-10-CM

## 2022-03-11 DIAGNOSIS — E04.1 THYROID NODULE: ICD-10-CM

## 2022-03-11 DIAGNOSIS — Z00.00 ANNUAL PHYSICAL EXAM: Primary | ICD-10-CM

## 2022-03-11 PROCEDURE — 85025 COMPLETE CBC W/AUTO DIFF WBC: CPT | Performed by: CLINICAL MEDICAL LABORATORY

## 2022-03-11 PROCEDURE — 80061 LIPID PANEL: CPT | Performed by: CLINICAL MEDICAL LABORATORY

## 2022-03-11 PROCEDURE — 99395 PREV VISIT EST AGE 18-39: CPT | Performed by: FAMILY MEDICINE

## 2022-03-11 PROCEDURE — 84443 ASSAY THYROID STIM HORMONE: CPT | Performed by: CLINICAL MEDICAL LABORATORY

## 2022-03-11 PROCEDURE — 99213 OFFICE O/P EST LOW 20 MIN: CPT | Performed by: FAMILY MEDICINE

## 2022-03-11 PROCEDURE — 80053 COMPREHEN METABOLIC PANEL: CPT | Performed by: CLINICAL MEDICAL LABORATORY

## 2022-03-11 PROCEDURE — 36415 COLL VENOUS BLD VENIPUNCTURE: CPT | Performed by: FAMILY MEDICINE

## 2022-03-11 ASSESSMENT — PATIENT HEALTH QUESTIONNAIRE - PHQ9
SUM OF ALL RESPONSES TO PHQ9 QUESTIONS 1 AND 2: 0
2. FEELING DOWN, DEPRESSED OR HOPELESS: NOT AT ALL
CLINICAL INTERPRETATION OF PHQ2 SCORE: NO FURTHER SCREENING NEEDED
SUM OF ALL RESPONSES TO PHQ9 QUESTIONS 1 AND 2: 0
1. LITTLE INTEREST OR PLEASURE IN DOING THINGS: NOT AT ALL

## 2022-03-12 LAB
ALBUMIN SERPL-MCNC: 4.1 G/DL (ref 3.6–5.1)
ALBUMIN/GLOB SERPL: 1.4 {RATIO} (ref 1–2.4)
ALP SERPL-CCNC: 64 UNITS/L (ref 45–117)
ALT SERPL-CCNC: 19 UNITS/L
ANION GAP SERPL CALC-SCNC: 10 MMOL/L (ref 10–20)
AST SERPL-CCNC: 18 UNITS/L
BASOPHILS # BLD: 0 K/MCL (ref 0–0.3)
BASOPHILS NFR BLD: 0 %
BILIRUB SERPL-MCNC: 0.4 MG/DL (ref 0.2–1)
BUN SERPL-MCNC: 11 MG/DL (ref 6–20)
BUN/CREAT SERPL: 17 (ref 7–25)
CALCIUM SERPL-MCNC: 8.9 MG/DL (ref 8.4–10.2)
CHLORIDE SERPL-SCNC: 106 MMOL/L (ref 98–107)
CHOLEST SERPL-MCNC: 186 MG/DL
CHOLEST/HDLC SERPL: 3.4 {RATIO}
CO2 SERPL-SCNC: 25 MMOL/L (ref 21–32)
CREAT SERPL-MCNC: 0.65 MG/DL (ref 0.51–0.95)
DEPRECATED RDW RBC: 45.6 FL (ref 39–50)
EOSINOPHIL # BLD: 0 K/MCL (ref 0–0.5)
EOSINOPHIL NFR BLD: 1 %
ERYTHROCYTE [DISTWIDTH] IN BLOOD: 12.6 % (ref 11–15)
FASTING DURATION TIME PATIENT: NORMAL H
FASTING DURATION TIME PATIENT: NORMAL H
GFR SERPLBLD BASED ON 1.73 SQ M-ARVRAT: >90 ML/MIN
GLOBULIN SER-MCNC: 2.9 G/DL (ref 2–4)
GLUCOSE SERPL-MCNC: 92 MG/DL (ref 70–99)
HCT VFR BLD CALC: 43.5 % (ref 36–46.5)
HDLC SERPL-MCNC: 55 MG/DL
HGB BLD-MCNC: 14.3 G/DL (ref 12–15.5)
IMM GRANULOCYTES # BLD AUTO: 0 K/MCL (ref 0–0.2)
IMM GRANULOCYTES # BLD: 0 %
LDLC SERPL CALC-MCNC: 115 MG/DL
LYMPHOCYTES # BLD: 2.1 K/MCL (ref 1–4.8)
LYMPHOCYTES NFR BLD: 39 %
MCH RBC QN AUTO: 32.1 PG (ref 26–34)
MCHC RBC AUTO-ENTMCNC: 32.9 G/DL (ref 32–36.5)
MCV RBC AUTO: 97.8 FL (ref 78–100)
MONOCYTES # BLD: 0.5 K/MCL (ref 0.3–0.9)
MONOCYTES NFR BLD: 8 %
NEUTROPHILS # BLD: 2.8 K/MCL (ref 1.8–7.7)
NEUTROPHILS NFR BLD: 52 %
NONHDLC SERPL-MCNC: 131 MG/DL
NRBC BLD MANUAL-RTO: 0 /100 WBC
PLATELET # BLD AUTO: 197 K/MCL (ref 140–450)
POTASSIUM SERPL-SCNC: 4.1 MMOL/L (ref 3.4–5.1)
PROT SERPL-MCNC: 7 G/DL (ref 6.4–8.2)
RBC # BLD: 4.45 MIL/MCL (ref 4–5.2)
SODIUM SERPL-SCNC: 137 MMOL/L (ref 135–145)
TRIGL SERPL-MCNC: 82 MG/DL
TSH SERPL-ACNC: 1.18 MCUNITS/ML (ref 0.35–5)
WBC # BLD: 5.4 K/MCL (ref 4.2–11)

## 2022-07-05 ENCOUNTER — OFFICE VISIT (OUTPATIENT)
Dept: OBGYN CLINIC | Facility: CLINIC | Age: 32
End: 2022-07-05
Payer: MEDICAID

## 2022-07-05 VITALS
HEIGHT: 62 IN | BODY MASS INDEX: 26.13 KG/M2 | DIASTOLIC BLOOD PRESSURE: 79 MMHG | SYSTOLIC BLOOD PRESSURE: 120 MMHG | HEART RATE: 76 BPM | WEIGHT: 142 LBS

## 2022-07-05 DIAGNOSIS — Z87.59 HISTORY OF NEONATAL DEATH: ICD-10-CM

## 2022-07-05 DIAGNOSIS — N92.6 MISSED MENSES: Primary | ICD-10-CM

## 2022-07-05 PROBLEM — T83.32XA IUD THREADS LOST: Status: RESOLVED | Noted: 2021-12-23 | Resolved: 2022-07-05

## 2022-07-05 LAB
CONTROL LINE PRESENT WITH A CLEAR BACKGROUND (YES/NO): YES YES/NO
PREGNANCY TEST, URINE: POSITIVE

## 2022-07-05 PROCEDURE — 81025 URINE PREGNANCY TEST: CPT | Performed by: ADVANCED PRACTICE MIDWIFE

## 2022-07-05 PROCEDURE — 3008F BODY MASS INDEX DOCD: CPT | Performed by: ADVANCED PRACTICE MIDWIFE

## 2022-07-05 PROCEDURE — 3078F DIAST BP <80 MM HG: CPT | Performed by: ADVANCED PRACTICE MIDWIFE

## 2022-07-05 PROCEDURE — 99214 OFFICE O/P EST MOD 30 MIN: CPT | Performed by: ADVANCED PRACTICE MIDWIFE

## 2022-07-05 PROCEDURE — 3074F SYST BP LT 130 MM HG: CPT | Performed by: ADVANCED PRACTICE MIDWIFE

## 2022-07-05 RX ORDER — PNV NO.95/FERROUS FUM/FOLIC AC 28MG-0.8MG
1 TABLET ORAL DAILY
Qty: 30 TABLET | Refills: 5 | Status: SHIPPED | OUTPATIENT
Start: 2022-07-05

## 2022-07-22 ENCOUNTER — HOSPITAL ENCOUNTER (OUTPATIENT)
Dept: ULTRASOUND IMAGING | Age: 32
Discharge: HOME | End: 2022-07-22
Attending: ADVANCED PRACTICE MIDWIFE
Payer: MEDICAID

## 2022-07-22 DIAGNOSIS — N92.6 MISSED MENSES: ICD-10-CM

## 2022-07-22 PROCEDURE — 76801 OB US < 14 WKS SINGLE FETUS: CPT | Performed by: ADVANCED PRACTICE MIDWIFE

## 2022-07-22 PROCEDURE — 76817 TRANSVAGINAL US OBSTETRIC: CPT | Performed by: ADVANCED PRACTICE MIDWIFE

## 2022-07-26 ENCOUNTER — TELEPHONE (OUTPATIENT)
Dept: OBGYN CLINIC | Facility: CLINIC | Age: 32
End: 2022-07-26

## 2022-07-26 NOTE — TELEPHONE ENCOUNTER
----- Message from Kelechi Petty CNM sent at 7/26/2022 10:10 AM CDT -----  Gestational age based on today's crown-rump length is consistent with 7 weeks 4 days +/-7 days.    Needs to schedule RN ed visit

## 2022-07-30 ENCOUNTER — NURSE ONLY (OUTPATIENT)
Dept: OBGYN CLINIC | Facility: CLINIC | Age: 32
End: 2022-07-30

## 2022-07-30 VITALS — BODY MASS INDEX: 26 KG/M2 | WEIGHT: 142 LBS

## 2022-07-30 DIAGNOSIS — Z34.81 PRENATAL CARE, SUBSEQUENT PREGNANCY IN FIRST TRIMESTER: Primary | ICD-10-CM

## 2022-07-30 PROCEDURE — 0500F INITIAL PRENATAL CARE VISIT: CPT | Performed by: ADVANCED PRACTICE MIDWIFE

## 2022-07-30 NOTE — PROGRESS NOTES
Nurse education complete via phone visit. Pt instructed to review folder & handouts. Pt hx MTHFR heterozygous & accidental death of 8 mos old son . NOB labs ordered. Desires FTS. Pap results in Epic. Denies any abnormal paps. Pt to complete EPDS & ALICIA at next visit NOB appt scheduled. Pt desires waterbirth    Pt. Has answered NO 5P questions and has NO  risk factors. Pt. Given What pregnant women need to know handout. Pt counseled on ACOG & ACNM guidelines recommending carrier testing. Carrier Testing, including Hemoglobin Evaluation offered through pt & insurance choice of [employer] or [employer].  Pt states previously completed testing & it was normal    req dental letter & placed in [facility]

## 2022-08-01 ENCOUNTER — TELEPHONE (OUTPATIENT)
Dept: PERINATAL CARE | Facility: HOSPITAL | Age: 32
End: 2022-08-01

## 2022-08-19 ENCOUNTER — LAB ENCOUNTER (OUTPATIENT)
Dept: LAB | Facility: HOSPITAL | Age: 32
End: 2022-08-19
Attending: ADVANCED PRACTICE MIDWIFE
Payer: MEDICAID

## 2022-08-19 ENCOUNTER — INITIAL PRENATAL (OUTPATIENT)
Dept: OBGYN CLINIC | Facility: CLINIC | Age: 32
End: 2022-08-19
Payer: MEDICAID

## 2022-08-19 VITALS
HEART RATE: 93 BPM | DIASTOLIC BLOOD PRESSURE: 69 MMHG | BODY MASS INDEX: 27 KG/M2 | WEIGHT: 148 LBS | SYSTOLIC BLOOD PRESSURE: 111 MMHG

## 2022-08-19 DIAGNOSIS — Z11.3 SCREENING FOR STDS (SEXUALLY TRANSMITTED DISEASES): ICD-10-CM

## 2022-08-19 DIAGNOSIS — Z34.81 PRENATAL CARE, SUBSEQUENT PREGNANCY IN FIRST TRIMESTER: ICD-10-CM

## 2022-08-19 DIAGNOSIS — Z34.81 PRENATAL CARE, SUBSEQUENT PREGNANCY IN FIRST TRIMESTER: Primary | ICD-10-CM

## 2022-08-19 LAB
ANTIBODY SCREEN: NEGATIVE
BASOPHILS # BLD AUTO: 0.04 X10(3) UL (ref 0–0.2)
BASOPHILS NFR BLD AUTO: 0.4 %
DEPRECATED HBV CORE AB SER IA-ACNC: 81.8 NG/ML
DEPRECATED RDW RBC AUTO: 44.5 FL (ref 35.1–46.3)
EOSINOPHIL # BLD AUTO: 0.06 X10(3) UL (ref 0–0.7)
EOSINOPHIL NFR BLD AUTO: 0.7 %
ERYTHROCYTE [DISTWIDTH] IN BLOOD BY AUTOMATED COUNT: 12.6 % (ref 11–15)
HBV SURFACE AG SER-ACNC: <0.1 [IU]/L
HBV SURFACE AG SERPL QL IA: NONREACTIVE
HCT VFR BLD AUTO: 36.8 %
HCV AB SERPL QL IA: NONREACTIVE
HGB BLD-MCNC: 12.4 G/DL
IMM GRANULOCYTES # BLD AUTO: 0.05 X10(3) UL (ref 0–1)
IMM GRANULOCYTES NFR BLD: 0.5 %
LYMPHOCYTES # BLD AUTO: 2.28 X10(3) UL (ref 1–4)
LYMPHOCYTES NFR BLD AUTO: 24.7 %
MCH RBC QN AUTO: 32.4 PG (ref 26–34)
MCHC RBC AUTO-ENTMCNC: 33.7 G/DL (ref 31–37)
MCV RBC AUTO: 96.1 FL
MONOCYTES # BLD AUTO: 0.57 X10(3) UL (ref 0.1–1)
MONOCYTES NFR BLD AUTO: 6.2 %
NEUTROPHILS # BLD AUTO: 6.22 X10 (3) UL (ref 1.5–7.7)
NEUTROPHILS # BLD AUTO: 6.22 X10(3) UL (ref 1.5–7.7)
NEUTROPHILS NFR BLD AUTO: 67.5 %
PLATELET # BLD AUTO: 188 10(3)UL (ref 150–450)
RBC # BLD AUTO: 3.83 X10(6)UL
RH BLOOD TYPE: POSITIVE
RUBV IGG SER QL: POSITIVE
RUBV IGG SER-ACNC: 34.9 IU/ML (ref 10–?)
WBC # BLD AUTO: 9.2 X10(3) UL (ref 4–11)

## 2022-08-19 PROCEDURE — 87086 URINE CULTURE/COLONY COUNT: CPT

## 2022-08-19 PROCEDURE — 86901 BLOOD TYPING SEROLOGIC RH(D): CPT

## 2022-08-19 PROCEDURE — 82728 ASSAY OF FERRITIN: CPT

## 2022-08-19 PROCEDURE — 86803 HEPATITIS C AB TEST: CPT

## 2022-08-19 PROCEDURE — 87340 HEPATITIS B SURFACE AG IA: CPT

## 2022-08-19 PROCEDURE — 85025 COMPLETE CBC W/AUTO DIFF WBC: CPT

## 2022-08-19 PROCEDURE — 86900 BLOOD TYPING SEROLOGIC ABO: CPT

## 2022-08-19 PROCEDURE — 3078F DIAST BP <80 MM HG: CPT | Performed by: ADVANCED PRACTICE MIDWIFE

## 2022-08-19 PROCEDURE — 87389 HIV-1 AG W/HIV-1&-2 AB AG IA: CPT

## 2022-08-19 PROCEDURE — 86780 TREPONEMA PALLIDUM: CPT

## 2022-08-19 PROCEDURE — 86850 RBC ANTIBODY SCREEN: CPT

## 2022-08-19 PROCEDURE — 86762 RUBELLA ANTIBODY: CPT

## 2022-08-19 PROCEDURE — 0502F SUBSEQUENT PRENATAL CARE: CPT | Performed by: ADVANCED PRACTICE MIDWIFE

## 2022-08-19 PROCEDURE — 87147 CULTURE TYPE IMMUNOLOGIC: CPT

## 2022-08-19 PROCEDURE — 3074F SYST BP LT 130 MM HG: CPT | Performed by: ADVANCED PRACTICE MIDWIFE

## 2022-08-19 PROCEDURE — 36415 COLL VENOUS BLD VENIPUNCTURE: CPT

## 2022-08-19 NOTE — PROGRESS NOTES
Santos Gutierrez, is at 11w4d, here for her NOB visit. Currently, she is feeling well. Denies 1st trimester danger signs. Coping well in general. States a bit more moodiness since the anniversary of her baby's death is approaching. Decided against re-starting Zoloft. States her kids have helped her move forward. Vital signs and weight reviewed  See flowsheets & Prenatal Physical    Today's Assessment/Plan: Urged to complete NOB labs. Keep scheduled MFM appt  Next visit: 4 weeks    Reviewed:   Prenatal visit schedule  Emergency contact info and safe use of messaging in MyChart  1st trimester precautions and expectations  Danger signs    Pt verbalized understanding. All questions answered.  No barriers to learning identified

## 2022-08-19 NOTE — ADDENDUM NOTE
Addended byEj Owens on: 8/19/2022 03:00 PM     Modules accepted: Orders Sent in referral to GI service to follow up with Dr. Bright for ER visit at his office on [facility] in [place].

## 2022-08-29 ENCOUNTER — HOSPITAL ENCOUNTER (OUTPATIENT)
Dept: PERINATAL CARE | Facility: HOSPITAL | Age: 32
End: 2022-08-29
Attending: ADVANCED PRACTICE MIDWIFE
Payer: MEDICAID

## 2022-08-29 ENCOUNTER — HOSPITAL ENCOUNTER (OUTPATIENT)
Dept: PERINATAL CARE | Facility: HOSPITAL | Age: 32
Discharge: HOME | End: 2022-08-29
Attending: OBSTETRICS & GYNECOLOGY
Payer: MEDICAID

## 2022-08-29 DIAGNOSIS — Z87.59 HISTORY OF NEONATAL DEATH: ICD-10-CM

## 2022-08-29 DIAGNOSIS — Z36.9 FIRST TRIMESTER SCREENING: ICD-10-CM

## 2022-08-29 DIAGNOSIS — Z34.81 PRENATAL CARE, SUBSEQUENT PREGNANCY IN FIRST TRIMESTER: ICD-10-CM

## 2022-08-29 DIAGNOSIS — Z36.9 FIRST TRIMESTER SCREENING: Primary | ICD-10-CM

## 2022-08-29 PROCEDURE — 76813 OB US NUCHAL MEAS 1 GEST: CPT | Performed by: OBSTETRICS & GYNECOLOGY

## 2022-08-31 PROBLEM — R82.71 GBS BACTERIURIA: Status: ACTIVE | Noted: 2022-08-31

## 2022-09-06 ENCOUNTER — TELEPHONE (OUTPATIENT)
Dept: PERINATAL CARE | Facility: HOSPITAL | Age: 32
End: 2022-09-06

## 2022-09-06 NOTE — TELEPHONE ENCOUNTER
Panorama screening results obt  Reviewed by DR Carl Honeycutt    Results:  low risk  Low Risk for Aneuploidies, triploidy and Monosomy X  Fetal Sex: not chosen  Fetal Fraction: 11.1%      Pt states understanding  Copy of results sent for scanning into pt record

## 2022-09-16 ENCOUNTER — LAB ENCOUNTER (OUTPATIENT)
Dept: LAB | Facility: HOSPITAL | Age: 32
End: 2022-09-16
Attending: ADVANCED PRACTICE MIDWIFE

## 2022-09-16 ENCOUNTER — TELEPHONE (OUTPATIENT)
Dept: PERINATAL CARE | Facility: HOSPITAL | Age: 32
End: 2022-09-16

## 2022-09-16 ENCOUNTER — ROUTINE PRENATAL (OUTPATIENT)
Dept: OBGYN CLINIC | Facility: CLINIC | Age: 32
End: 2022-09-16
Payer: MEDICAID

## 2022-09-16 VITALS
BODY MASS INDEX: 28 KG/M2 | SYSTOLIC BLOOD PRESSURE: 127 MMHG | WEIGHT: 152 LBS | DIASTOLIC BLOOD PRESSURE: 80 MMHG | HEART RATE: 105 BPM

## 2022-09-16 DIAGNOSIS — Z34.82 PRENATAL CARE, SUBSEQUENT PREGNANCY IN SECOND TRIMESTER: Primary | ICD-10-CM

## 2022-09-16 DIAGNOSIS — Z34.82 PRENATAL CARE, SUBSEQUENT PREGNANCY IN SECOND TRIMESTER: ICD-10-CM

## 2022-09-16 PROCEDURE — 82105 ALPHA-FETOPROTEIN SERUM: CPT

## 2022-09-16 PROCEDURE — 36415 COLL VENOUS BLD VENIPUNCTURE: CPT

## 2022-09-16 PROCEDURE — 3074F SYST BP LT 130 MM HG: CPT | Performed by: ADVANCED PRACTICE MIDWIFE

## 2022-09-16 PROCEDURE — 0502F SUBSEQUENT PRENATAL CARE: CPT | Performed by: ADVANCED PRACTICE MIDWIFE

## 2022-09-16 PROCEDURE — 3079F DIAST BP 80-89 MM HG: CPT | Performed by: ADVANCED PRACTICE MIDWIFE

## 2022-09-16 NOTE — TELEPHONE ENCOUNTER
Primary Diagnosis Code: Z34.82 Description: Encounter for supervision of other normal pregnancy, second trimester  Secondary Diagnosis Code:  Description:   Date of Service: Not provided    CPT Code: 41283 Description: OB US >/= 14 WKS SNGL FETUS  Authorization Number: [id number]  Review Date: 9/16/2022 4:55:29 PM  Expiration Date: 6/13/2023  Status: Your case has been Approved.

## 2022-09-16 NOTE — PROGRESS NOTES
Nydia Dailey, is at 15w4d, here for her ANTHONY visit. Currently, she is feeling well. Denies 2nd trimester danger signs. Desires AFP today. Vital signs and weight reviewed  See flowsheets   GBS bacteruria reviewed with patient    Today's Assessment/Plan: AFP and anatomy scan ordered  Review need for IP antibiotics. Will likely need IOL due to fast labors, amenable  Next visit: 4 weeks    Reviewed:   Prenatal visit schedule  Danger signs    Pt verbalized understanding. All questions answered.  No barriers to learning identified

## 2022-09-19 LAB
AFP SMOKING: NO
FAMILY HX NEURAL TUBE DEFECT: NO
INSULIN REQ MATERNAL DIABETES: NO
MATERNAL AGE OF DELIVERY: 30.3 YR
MOM FOR AFP: 1
PATIENT'S AFP: 32 NG/ML

## 2022-10-12 ENCOUNTER — ROUTINE PRENATAL (OUTPATIENT)
Dept: OBGYN CLINIC | Facility: CLINIC | Age: 32
End: 2022-10-12
Payer: MEDICAID

## 2022-10-12 VITALS — BODY MASS INDEX: 28 KG/M2 | DIASTOLIC BLOOD PRESSURE: 78 MMHG | WEIGHT: 155 LBS | SYSTOLIC BLOOD PRESSURE: 115 MMHG

## 2022-10-12 DIAGNOSIS — Z34.82 PRENATAL CARE, SUBSEQUENT PREGNANCY IN SECOND TRIMESTER: Primary | ICD-10-CM

## 2022-10-12 PROCEDURE — 3078F DIAST BP <80 MM HG: CPT | Performed by: ADVANCED PRACTICE MIDWIFE

## 2022-10-12 PROCEDURE — 0502F SUBSEQUENT PRENATAL CARE: CPT | Performed by: ADVANCED PRACTICE MIDWIFE

## 2022-10-12 PROCEDURE — 3074F SYST BP LT 130 MM HG: CPT | Performed by: ADVANCED PRACTICE MIDWIFE

## 2022-10-12 NOTE — PROGRESS NOTES
Active fetus Denies any complaints. Denies any vaginal bleeding, leaking of fluid or vaginal discharge. No signs signs of PTL. Reviewed S&S of PTL  Warning signs reviewed  All questions answered.   Will have Flu and Covid booster after 20 week scan

## 2022-10-21 ENCOUNTER — HOSPITAL ENCOUNTER (OUTPATIENT)
Dept: PERINATAL CARE | Facility: HOSPITAL | Age: 32
Discharge: HOME | End: 2022-10-21
Attending: OBSTETRICS & GYNECOLOGY
Payer: MEDICAID

## 2022-10-21 ENCOUNTER — HOSPITAL ENCOUNTER (OUTPATIENT)
Dept: PERINATAL CARE | Facility: HOSPITAL | Age: 32
Discharge: HOME | End: 2022-10-21
Attending: ADVANCED PRACTICE MIDWIFE
Payer: MEDICAID

## 2022-10-21 VITALS
WEIGHT: 157 LBS | BODY MASS INDEX: 28.89 KG/M2 | HEART RATE: 85 BPM | SYSTOLIC BLOOD PRESSURE: 123 MMHG | HEIGHT: 62 IN | DIASTOLIC BLOOD PRESSURE: 71 MMHG

## 2022-10-21 DIAGNOSIS — Z36.3 SCREENING, ANTENATAL, FOR MALFORMATION BY ULTRASOUND: Primary | ICD-10-CM

## 2022-10-21 DIAGNOSIS — Z36.3 SCREENING, ANTENATAL, FOR MALFORMATION BY ULTRASOUND: ICD-10-CM

## 2022-10-21 PROCEDURE — 76805 OB US >/= 14 WKS SNGL FETUS: CPT | Performed by: OBSTETRICS & GYNECOLOGY

## 2022-10-24 ENCOUNTER — TELEPHONE (OUTPATIENT)
Dept: OBGYN CLINIC | Facility: CLINIC | Age: 32
End: 2022-10-24

## 2022-10-24 NOTE — TELEPHONE ENCOUNTER
Michaela Dickson CNM  [facility] Clinical Pool  Please notify patient of normal anatomy scan with normal placenta location.

## 2022-10-28 ENCOUNTER — TELEPHONE (OUTPATIENT)
Dept: OBGYN CLINIC | Facility: CLINIC | Age: 32
End: 2022-10-28

## 2022-10-28 NOTE — TELEPHONE ENCOUNTER
Pt does not know what pcp means. Pt originally thought she did not have a pcp so recommended she go to IC or UC for testing. Saw pcp info in Epic & pt states she did see that person prior to pregnancy. Encouraged pt to flu w/ pcp that she has an established relationship with.  Pt verbalized an understanding & agrees w/ plan

## 2022-11-06 PROBLEM — O09.219 HX OF PRECIPITOUS LABOR AND DELIVERIES, ANTEPARTUM: Status: ACTIVE | Noted: 2022-11-06

## 2022-11-06 PROBLEM — Z34.90 ENCOUNTER FOR SUPERVISION OF NORMAL PREGNANCY: Status: ACTIVE | Noted: 2022-11-06

## 2022-11-06 PROBLEM — Z34.90 ENCOUNTER FOR SUPERVISION OF NORMAL PREGNANCY (HCC): Status: ACTIVE | Noted: 2022-11-06

## 2022-11-06 PROBLEM — O09.219: Status: ACTIVE | Noted: 2022-11-06

## 2022-11-06 NOTE — PROGRESS NOTES
Subjective:  Luis Jeffries is a 36year old [id number] pt at 23w1d, d/b LMP c/w 7.4 wk US presents for ANTHONY. Had cough, runny nose, congestion 10/28. Feeling better now. Wants to wait on flu vaccine till next visit. ROS:  Denies cramping, bleeding, leaking of fluid. Fetus is active. Denies s/s of COVID. Denies Known COVID contacts. Patient Active Problem List:     History of  death     GBS bacteriuria     Encounter for supervision of normal pregnancy     Hx of precipitous labor and deliveries, antepartum     BMI 25.0-25.9,adult       Objective:     22  1633   BP: 113/72   Pulse: 84   Weight: 162 lb (73.5 kg)        Assessment:    History of  death  Going to therapy? Death from asphyxiation. BMI 25.0-25.9,adult  TWG 22  Dietary education       Orders Placed This Encounter      Flulaval 6 months and older 0.5 ml PFS [[id number]]     Declines Flu today since recently had flu. Return in about 4 weeks (around 2022). Reviewed:   Prenatal visit schedule   labor precautions  Kick counts  Danger signs     Pt verbalized understanding. All questions answered.  No barriers to learning identified    Gio Lawson CNM

## 2022-11-08 ENCOUNTER — ROUTINE PRENATAL (OUTPATIENT)
Dept: OBGYN CLINIC | Facility: CLINIC | Age: 32
End: 2022-11-08
Payer: MEDICAID

## 2022-11-08 VITALS
WEIGHT: 162 LBS | SYSTOLIC BLOOD PRESSURE: 113 MMHG | DIASTOLIC BLOOD PRESSURE: 72 MMHG | BODY MASS INDEX: 30 KG/M2 | HEART RATE: 84 BPM

## 2022-11-08 DIAGNOSIS — Z34.02 ENCOUNTER FOR SUPERVISION OF NORMAL FIRST PREGNANCY IN SECOND TRIMESTER: Primary | ICD-10-CM

## 2022-11-08 DIAGNOSIS — Z28.21 INFLUENZA VACCINE REFUSED: ICD-10-CM

## 2022-11-08 PROCEDURE — 0502F SUBSEQUENT PRENATAL CARE: CPT | Performed by: ADVANCED PRACTICE MIDWIFE

## 2022-11-08 PROCEDURE — 3074F SYST BP LT 130 MM HG: CPT | Performed by: ADVANCED PRACTICE MIDWIFE

## 2022-11-08 PROCEDURE — 3078F DIAST BP <80 MM HG: CPT | Performed by: ADVANCED PRACTICE MIDWIFE

## 2022-12-19 ENCOUNTER — ROUTINE PRENATAL (OUTPATIENT)
Dept: OBGYN CLINIC | Facility: CLINIC | Age: 32
End: 2022-12-19
Payer: MEDICAID

## 2022-12-19 VITALS
BODY MASS INDEX: 31 KG/M2 | HEART RATE: 101 BPM | DIASTOLIC BLOOD PRESSURE: 70 MMHG | WEIGHT: 171 LBS | SYSTOLIC BLOOD PRESSURE: 112 MMHG

## 2022-12-19 DIAGNOSIS — Z34.02 ENCOUNTER FOR SUPERVISION OF NORMAL FIRST PREGNANCY IN SECOND TRIMESTER: ICD-10-CM

## 2022-12-19 DIAGNOSIS — Z34.83 PRENATAL CARE, SUBSEQUENT PREGNANCY IN THIRD TRIMESTER: Primary | ICD-10-CM

## 2022-12-21 ENCOUNTER — PATIENT MESSAGE (OUTPATIENT)
Dept: OBGYN CLINIC | Facility: CLINIC | Age: 32
End: 2022-12-21

## 2022-12-21 NOTE — TELEPHONE ENCOUNTER
From: Nilo Calixto  To: Louis Rankin CNM  Sent: 12/21/2022 6:44 AM CST  Subject: Antibiotics     Hello   I went to urgent care yesterday, my  and kids have tested positive for strep throat. My test came back negative, I have been sharing drinks and food with my little child. So they decided to also give me antibiotics. They prescribed amoxicillin, is that safe to take? The doctor said it can cause membrane rupture or pre term labor. So now I am scared.

## 2022-12-21 NOTE — TELEPHONE ENCOUNTER
Spoke to pt per MES. Explained to pt Amoxacillin is safe in pregnancy. Pt verbalized understanding and agrees with care plan.

## 2022-12-28 ENCOUNTER — LAB ENCOUNTER (OUTPATIENT)
Dept: LAB | Facility: HOSPITAL | Age: 32
End: 2022-12-28
Attending: ADVANCED PRACTICE MIDWIFE
Payer: MEDICAID

## 2022-12-28 DIAGNOSIS — Z34.02 ENCOUNTER FOR SUPERVISION OF NORMAL FIRST PREGNANCY IN SECOND TRIMESTER: ICD-10-CM

## 2022-12-28 LAB
DEPRECATED RDW RBC AUTO: 47.2 FL (ref 35.1–46.3)
ERYTHROCYTE [DISTWIDTH] IN BLOOD BY AUTOMATED COUNT: 13.2 % (ref 11–15)
GLUCOSE 1H P GLC SERPL-MCNC: 142 MG/DL
HCT VFR BLD AUTO: 36.3 %
HGB BLD-MCNC: 12.2 G/DL
MCH RBC QN AUTO: 32.4 PG (ref 26–34)
MCHC RBC AUTO-ENTMCNC: 33.6 G/DL (ref 31–37)
MCV RBC AUTO: 96.5 FL
PLATELET # BLD AUTO: 170 10(3)UL (ref 150–450)
RBC # BLD AUTO: 3.76 X10(6)UL
WBC # BLD AUTO: 9.3 X10(3) UL (ref 4–11)

## 2022-12-28 PROCEDURE — 36415 COLL VENOUS BLD VENIPUNCTURE: CPT

## 2022-12-28 PROCEDURE — 82950 GLUCOSE TEST: CPT

## 2022-12-28 PROCEDURE — 86780 TREPONEMA PALLIDUM: CPT

## 2022-12-28 PROCEDURE — 85027 COMPLETE CBC AUTOMATED: CPT

## 2022-12-28 PROCEDURE — 87389 HIV-1 AG W/HIV-1&-2 AB AG IA: CPT

## 2022-12-29 ENCOUNTER — TELEPHONE (OUTPATIENT)
Dept: OBGYN CLINIC | Facility: CLINIC | Age: 32
End: 2022-12-29

## 2022-12-29 DIAGNOSIS — O99.810 GLUCOSE INTOLERANCE OF PREGNANCY: Primary | ICD-10-CM

## 2022-12-29 NOTE — TELEPHONE ENCOUNTER
Spoke with pt advised of lab results and MBW's rec's. Instructions for 3hr gtt reviewed with pt. Pt agreed and voiced understanding.

## 2022-12-29 NOTE — TELEPHONE ENCOUNTER
----- Message from Mickey Dickson CNM sent at 12/28/2022  6:24 PM CST -----  Abnormal glucose. Normal HIV. Patient needs to schedule 3 hour - please call to inform.

## 2022-12-30 LAB — T PALLIDUM AB SER QL: NEGATIVE

## 2022-12-31 ENCOUNTER — LABORATORY ENCOUNTER (OUTPATIENT)
Dept: LAB | Facility: HOSPITAL | Age: 32
End: 2022-12-31
Attending: ADVANCED PRACTICE MIDWIFE
Payer: MEDICAID

## 2022-12-31 DIAGNOSIS — O99.810 GLUCOSE INTOLERANCE OF PREGNANCY: ICD-10-CM

## 2022-12-31 LAB
EST. AVERAGE GLUCOSE BLD GHB EST-MCNC: 100 MG/DL (ref 68–126)
GLUCOSE 1H P GLC SERPL-MCNC: 146 MG/DL
GLUCOSE 2H P GLC SERPL-MCNC: 105 MG/DL
GLUCOSE 3H P GLC SERPL-MCNC: 145 MG/DL (ref 70–140)
GLUCOSE P FAST SERPL-MCNC: 77 MG/DL
HBA1C MFR BLD: 5.1 % (ref ?–5.7)

## 2022-12-31 PROCEDURE — 83036 HEMOGLOBIN GLYCOSYLATED A1C: CPT

## 2022-12-31 PROCEDURE — 82952 GTT-ADDED SAMPLES: CPT

## 2022-12-31 PROCEDURE — 36415 COLL VENOUS BLD VENIPUNCTURE: CPT

## 2022-12-31 PROCEDURE — 82951 GLUCOSE TOLERANCE TEST (GTT): CPT

## 2023-01-07 PROBLEM — O26.00 EXCESSIVE WEIGHT GAIN DURING PREGNANCY: Status: ACTIVE | Noted: 2022-11-06

## 2023-01-07 PROBLEM — Z34.90 PREGNANT: Status: ACTIVE | Noted: 2022-11-06

## 2023-01-07 PROBLEM — Z34.90 PREGNANT (HCC): Status: ACTIVE | Noted: 2022-11-06

## 2023-01-07 PROBLEM — O26.00 EXCESSIVE WEIGHT GAIN DURING PREGNANCY (HCC): Status: ACTIVE | Noted: 2022-11-06

## 2023-01-07 PROBLEM — E66.3 OVERWEIGHT (BMI 25.0-29.9): Status: ACTIVE | Noted: 2022-11-06

## 2023-01-17 ENCOUNTER — TELEPHONE (OUTPATIENT)
Dept: OBGYN CLINIC | Facility: CLINIC | Age: 33
End: 2023-01-17

## 2023-01-17 ENCOUNTER — ROUTINE PRENATAL (OUTPATIENT)
Dept: OBGYN CLINIC | Facility: CLINIC | Age: 33
End: 2023-01-17

## 2023-01-17 VITALS
DIASTOLIC BLOOD PRESSURE: 71 MMHG | BODY MASS INDEX: 33 KG/M2 | SYSTOLIC BLOOD PRESSURE: 124 MMHG | WEIGHT: 178 LBS | HEART RATE: 108 BPM

## 2023-01-17 DIAGNOSIS — U07.1 COVID-19 AFFECTING PREGNANCY IN THIRD TRIMESTER: ICD-10-CM

## 2023-01-17 DIAGNOSIS — Z34.82 ENCOUNTER FOR SUPERVISION OF OTHER NORMAL PREGNANCY IN SECOND TRIMESTER: Primary | ICD-10-CM

## 2023-01-17 DIAGNOSIS — Z87.59 HISTORY OF NEONATAL DEATH: ICD-10-CM

## 2023-01-17 DIAGNOSIS — O98.513 COVID-19 AFFECTING PREGNANCY IN THIRD TRIMESTER: ICD-10-CM

## 2023-01-17 PROBLEM — Z28.21 REFUSES TETANUS, DIPHTHERIA, AND ACELLULAR PERTUSSIS (TDAP) VACCINATION: Status: ACTIVE | Noted: 2023-01-17

## 2023-01-17 PROCEDURE — 0502F SUBSEQUENT PRENATAL CARE: CPT | Performed by: ADVANCED PRACTICE MIDWIFE

## 2023-01-17 PROCEDURE — 3078F DIAST BP <80 MM HG: CPT | Performed by: ADVANCED PRACTICE MIDWIFE

## 2023-01-17 PROCEDURE — 3074F SYST BP LT 130 MM HG: CPT | Performed by: ADVANCED PRACTICE MIDWIFE

## 2023-01-17 NOTE — PROGRESS NOTES
Declines Tdap. Desires 39 wk IOL. Getting more worried now that is getting closer to LANDRY.  also getting worried. They have not done counseling. Discussed recommendation for counseling for coping strategies as likely to have more anxiety after baby born. Also to keep open on possible need for med. She is open to talking with therapist. Referral placed. Worried as had boutique ultrasound and she has hx of short cord and the cord was in front of the babies face. Baby is active. Advised no way to tell if there is a short cord typically until birth. Continue to monitor kick counts to be sure baby is active. No contractions, LOF or bleeding. Reports in the last month has been sick with Flu, covid & strep. Feels like has a yeast infection from antibiotics. Recommend OTC monistat/clotrimazole x 7 night. If not resolved can do culture at [facility]. Warning signs reviewed.

## 2023-01-17 NOTE — TELEPHONE ENCOUNTER
Notified pt of MJ instructions. Call transferred to [facility] for pt to schedule ultrasound.  Pt verbalized an understanding & agrees w/ plan

## 2023-01-17 NOTE — TELEPHONE ENCOUNTER
Saji let pt know her IOL is scheduled for 2/27 @ 8am. Also she reported covid in the last month. After she left I noticed we were not aware of this- I ordered a growth ultrasound. Please have her schedule.  Thanks MJ

## 2023-01-18 ENCOUNTER — TELEPHONE (OUTPATIENT)
Dept: PERINATAL CARE | Facility: HOSPITAL | Age: 33
End: 2023-01-18

## 2023-01-18 NOTE — TELEPHONE ENCOUNTER
PA submitted for CPT code 20957 along with clinical documentation. Pending medical review.  Case#  [id number]

## 2023-01-18 NOTE — TELEPHONE ENCOUNTER
PA submitted for CPT code 95605 along with clinical documentation. Pending medical review.  Case # [id number]

## 2023-01-19 NOTE — TELEPHONE ENCOUNTER
CPT code 34400 was approved, Franny Linn # [id number]  CPT code 37575 was approved, Franny Linn #    [id number]

## 2023-01-21 ENCOUNTER — APPOINTMENT (OUTPATIENT)
Dept: ULTRASOUND IMAGING | Facility: HOSPITAL | Age: 33
End: 2023-01-21
Attending: ADVANCED PRACTICE MIDWIFE
Payer: MEDICAID

## 2023-01-21 ENCOUNTER — HOSPITAL ENCOUNTER (OUTPATIENT)
Facility: HOSPITAL | Age: 33
Discharge: HOME | End: 2023-01-22
Attending: ADVANCED PRACTICE MIDWIFE | Admitting: OBSTETRICS & GYNECOLOGY
Payer: MEDICAID

## 2023-01-21 VITALS
HEART RATE: 84 BPM | RESPIRATION RATE: 18 BRPM | SYSTOLIC BLOOD PRESSURE: 125 MMHG | TEMPERATURE: 99 F | DIASTOLIC BLOOD PRESSURE: 60 MMHG

## 2023-01-21 LAB
APTT PPP: 27 SECONDS (ref 23.3–35.6)
BASOPHILS # BLD AUTO: 0.08 X10(3) UL (ref 0–0.2)
BASOPHILS NFR BLD AUTO: 0.7 %
DEPRECATED RDW RBC AUTO: 46 FL (ref 35.1–46.3)
EOSINOPHIL # BLD AUTO: 0.09 X10(3) UL (ref 0–0.7)
EOSINOPHIL NFR BLD AUTO: 0.8 %
ERYTHROCYTE [DISTWIDTH] IN BLOOD BY AUTOMATED COUNT: 13.4 % (ref 11–15)
FIBRINOGEN PPP-MCNC: 320 MG/DL (ref 180–480)
HCT VFR BLD AUTO: 36.4 %
HGB BLD-MCNC: 12.3 G/DL
IMM GRANULOCYTES # BLD AUTO: 0.19 X10(3) UL (ref 0–1)
IMM GRANULOCYTES NFR BLD: 1.7 %
INR BLD: 1.06 (ref 0.85–1.16)
LYMPHOCYTES # BLD AUTO: 2.41 X10(3) UL (ref 1–4)
LYMPHOCYTES NFR BLD AUTO: 21.7 %
MCH RBC QN AUTO: 31.9 PG (ref 26–34)
MCHC RBC AUTO-ENTMCNC: 33.8 G/DL (ref 31–37)
MCV RBC AUTO: 94.5 FL
MONOCYTES # BLD AUTO: 1.06 X10(3) UL (ref 0.1–1)
MONOCYTES NFR BLD AUTO: 9.6 %
NEUTROPHILS # BLD AUTO: 7.26 X10 (3) UL (ref 1.5–7.7)
NEUTROPHILS # BLD AUTO: 7.26 X10(3) UL (ref 1.5–7.7)
NEUTROPHILS NFR BLD AUTO: 65.5 %
PLATELET # BLD AUTO: 197 10(3)UL (ref 150–450)
PROTHROMBIN TIME: 13.7 SECONDS (ref 11.6–14.8)
RBC # BLD AUTO: 3.85 X10(6)UL
WBC # BLD AUTO: 11.1 X10(3) UL (ref 4–11)

## 2023-01-21 PROCEDURE — 76815 OB US LIMITED FETUS(S): CPT | Performed by: ADVANCED PRACTICE MIDWIFE

## 2023-01-22 PROCEDURE — 59025 FETAL NON-STRESS TEST: CPT | Performed by: ADVANCED PRACTICE MIDWIFE
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
## 2023-01-25 ENCOUNTER — ROUTINE PRENATAL (OUTPATIENT)
Dept: OBGYN CLINIC | Facility: CLINIC | Age: 33
End: 2023-01-25

## 2023-01-25 VITALS
WEIGHT: 179 LBS | SYSTOLIC BLOOD PRESSURE: 120 MMHG | DIASTOLIC BLOOD PRESSURE: 82 MMHG | HEART RATE: 90 BPM | BODY MASS INDEX: 33 KG/M2

## 2023-01-25 DIAGNOSIS — Z34.82 ENCOUNTER FOR SUPERVISION OF OTHER NORMAL PREGNANCY IN SECOND TRIMESTER: Primary | ICD-10-CM

## 2023-01-25 PROCEDURE — 3079F DIAST BP 80-89 MM HG: CPT | Performed by: ADVANCED PRACTICE MIDWIFE

## 2023-01-25 PROCEDURE — 3074F SYST BP LT 130 MM HG: CPT | Performed by: ADVANCED PRACTICE MIDWIFE

## 2023-01-25 PROCEDURE — 0500F INITIAL PRENATAL CARE VISIT: CPT | Performed by: ADVANCED PRACTICE MIDWIFE

## 2023-01-26 NOTE — PROGRESS NOTES
Active fetus Denies any complaints. Denies any vaginal bleeding, leaking of fluid or vaginal discharge. No signs signs of PTL. Reviewed S&S of PTL  Warning signs reviewed  All questions answered.
Hide Additional Notes?: No
Detail Level: Detailed
Detail Level: Zone

## 2023-02-02 ENCOUNTER — HOSPITAL ENCOUNTER (OUTPATIENT)
Dept: PERINATAL CARE | Facility: HOSPITAL | Age: 33
Discharge: HOME | End: 2023-02-02
Attending: OBSTETRICS & GYNECOLOGY
Payer: MEDICAID

## 2023-02-02 VITALS
BODY MASS INDEX: 33 KG/M2 | WEIGHT: 179 LBS | DIASTOLIC BLOOD PRESSURE: 74 MMHG | SYSTOLIC BLOOD PRESSURE: 117 MMHG | HEART RATE: 95 BPM

## 2023-02-02 DIAGNOSIS — O26.03 EXCESSIVE WEIGHT GAIN DURING PREGNANCY IN THIRD TRIMESTER: ICD-10-CM

## 2023-02-02 DIAGNOSIS — U07.1 COVID-19 AFFECTING PREGNANCY IN THIRD TRIMESTER: ICD-10-CM

## 2023-02-02 DIAGNOSIS — O98.513 COVID-19 AFFECTING PREGNANCY IN THIRD TRIMESTER: Primary | ICD-10-CM

## 2023-02-02 DIAGNOSIS — U07.1 COVID-19 AFFECTING PREGNANCY IN THIRD TRIMESTER: Primary | ICD-10-CM

## 2023-02-02 DIAGNOSIS — O98.513 COVID-19 AFFECTING PREGNANCY IN THIRD TRIMESTER: ICD-10-CM

## 2023-02-02 PROCEDURE — 76816 OB US FOLLOW-UP PER FETUS: CPT | Performed by: OBSTETRICS & GYNECOLOGY

## 2023-02-02 PROCEDURE — 76819 FETAL BIOPHYS PROFIL W/O NST: CPT

## 2023-02-06 PROBLEM — R73.09 ELEVATED GLUCOSE TOLERANCE TEST: Status: ACTIVE | Noted: 2023-02-06

## 2023-02-08 ENCOUNTER — ROUTINE PRENATAL (OUTPATIENT)
Dept: OBGYN CLINIC | Facility: CLINIC | Age: 33
End: 2023-02-08

## 2023-02-08 VITALS
WEIGHT: 185 LBS | DIASTOLIC BLOOD PRESSURE: 80 MMHG | SYSTOLIC BLOOD PRESSURE: 125 MMHG | BODY MASS INDEX: 34 KG/M2 | HEART RATE: 111 BPM

## 2023-02-08 DIAGNOSIS — Z34.83 PRENATAL CARE, SUBSEQUENT PREGNANCY IN THIRD TRIMESTER: Primary | ICD-10-CM

## 2023-02-08 PROCEDURE — 3074F SYST BP LT 130 MM HG: CPT | Performed by: ADVANCED PRACTICE MIDWIFE

## 2023-02-08 PROCEDURE — 0502F SUBSEQUENT PRENATAL CARE: CPT | Performed by: ADVANCED PRACTICE MIDWIFE

## 2023-02-08 PROCEDURE — 3079F DIAST BP 80-89 MM HG: CPT | Performed by: ADVANCED PRACTICE MIDWIFE

## 2023-02-08 NOTE — PROGRESS NOTES
Sukh Banda, is at 36w2d, here for her ANTHONY visit. Currently, she is feeling well. Denies 3rd trimester danger signs. Wants SVE because she has been having pains since fall last week. Vital signs and weight reviewed  See flowsheets   GBS bacteruria    Assessment/Plan: Care is up to date. IOL scheduled for 2/27  Next visit: 1 week    Reviewed:   Prenatal visit schedule  Kick counts  Danger signs  Labor precautions    Pt verbalized understanding. All questions answered.  No barriers to learning identified

## 2023-02-09 ENCOUNTER — TELEPHONE (OUTPATIENT)
Dept: OBGYN CLINIC | Facility: CLINIC | Age: 33
End: 2023-02-09

## 2023-02-09 RX ORDER — BREAST PUMP
EACH MISCELLANEOUS
Qty: 1 EACH | Refills: 0 | Status: SHIPPED
Start: 2023-02-09

## 2023-02-16 ENCOUNTER — ROUTINE PRENATAL (OUTPATIENT)
Dept: OBGYN CLINIC | Facility: CLINIC | Age: 33
End: 2023-02-16

## 2023-02-16 VITALS
BODY MASS INDEX: 34 KG/M2 | SYSTOLIC BLOOD PRESSURE: 124 MMHG | HEART RATE: 90 BPM | WEIGHT: 187 LBS | DIASTOLIC BLOOD PRESSURE: 70 MMHG

## 2023-02-16 DIAGNOSIS — O26.893 PELVIC PAIN AFFECTING PREGNANCY IN THIRD TRIMESTER, ANTEPARTUM: ICD-10-CM

## 2023-02-16 DIAGNOSIS — R10.2 PELVIC PAIN AFFECTING PREGNANCY IN THIRD TRIMESTER, ANTEPARTUM: ICD-10-CM

## 2023-02-16 DIAGNOSIS — Z34.03 ENCOUNTER FOR SUPERVISION OF NORMAL FIRST PREGNANCY IN THIRD TRIMESTER: Primary | ICD-10-CM

## 2023-02-16 PROCEDURE — 3078F DIAST BP <80 MM HG: CPT | Performed by: ADVANCED PRACTICE MIDWIFE

## 2023-02-16 PROCEDURE — 0502F SUBSEQUENT PRENATAL CARE: CPT | Performed by: ADVANCED PRACTICE MIDWIFE

## 2023-02-16 PROCEDURE — 3074F SYST BP LT 130 MM HG: CPT | Performed by: ADVANCED PRACTICE MIDWIFE

## 2023-02-17 NOTE — PROGRESS NOTES
Yolande Pratt is a 29year old , at 37w3d, here for her return OB visit. Currently, she is feeling well. Denies bleeding and leakage of fluid. Endorses active fetus. Has been having contractions for the past few days that are uncomfortable but not very frequent. Gets about 2 per hour. Desires SVE today. Vital signs and weight reviewed  See flowsheets     Today's Assessment/Plan:   1. Labor precautions reviewed, when to call and how to page midwife on call. 2. Discussed antibiotics in labor for GBS prophylaxis    Next visit: Follow up OB: 1 week    Reviewed:   3rd trimester precautions and expectations  Labor precautions  Kick counts  Danger signs    Pt verbalized understanding. All questions answered.  No barriers to learning identified

## 2023-02-23 ENCOUNTER — TELEPHONE (OUTPATIENT)
Dept: OBGYN UNIT | Facility: HOSPITAL | Age: 33
End: 2023-02-23

## 2023-02-24 ENCOUNTER — ROUTINE PRENATAL (OUTPATIENT)
Dept: OBGYN CLINIC | Facility: CLINIC | Age: 33
End: 2023-02-24

## 2023-02-24 VITALS
DIASTOLIC BLOOD PRESSURE: 74 MMHG | HEART RATE: 94 BPM | WEIGHT: 187 LBS | SYSTOLIC BLOOD PRESSURE: 105 MMHG | BODY MASS INDEX: 34 KG/M2

## 2023-02-24 DIAGNOSIS — Z34.83 PRENATAL CARE, SUBSEQUENT PREGNANCY IN THIRD TRIMESTER: Primary | ICD-10-CM

## 2023-02-24 PROCEDURE — 0502F SUBSEQUENT PRENATAL CARE: CPT | Performed by: ADVANCED PRACTICE MIDWIFE

## 2023-02-24 PROCEDURE — 3078F DIAST BP <80 MM HG: CPT | Performed by: ADVANCED PRACTICE MIDWIFE

## 2023-02-24 PROCEDURE — 3074F SYST BP LT 130 MM HG: CPT | Performed by: ADVANCED PRACTICE MIDWIFE

## 2023-02-24 NOTE — PROGRESS NOTES
Owen Franks, is at 38w4d, here for her ANTHONY visit. Currently, she is feeling well. Denies 3rd trimester danger signs. Wants SVE. Has IOL scheduled for Monday. Birth plan reviewed:    Support:  and mothers  Pain management: un-medicated  Vitamin K: yes  Erythromycin ointment: yes  Placenta: discard  Circumcision: no circ  Peds: [facility]  Feeding method: breast/has pump  Housing/car seat: stable/yes  Contraception: Paragard      Vital signs and weight reviewed  See flowsheets     Assessment/Plan: Care is up to date  Next visit: for IOL on Monday then routine postpartum care    Reviewed:   Kick counts  Danger signs  Labor precautions  Current L&D policies: Three visitors plus     Pt verbalized understanding. All questions answered.  No barriers to learning identified

## 2023-02-26 ENCOUNTER — MOBILE ENCOUNTER (OUTPATIENT)
Dept: OBGYN CLINIC | Facility: CLINIC | Age: 33
End: 2023-02-26

## 2023-02-26 ENCOUNTER — HOSPITAL ENCOUNTER (INPATIENT)
Facility: HOSPITAL | Age: 33
LOS: 1 days | Discharge: HOME | End: 2023-02-27
Attending: ADVANCED PRACTICE MIDWIFE | Admitting: OBSTETRICS & GYNECOLOGY
Payer: MEDICAID

## 2023-02-26 PROBLEM — Z34.90 PREGNANCY (HCC): Status: ACTIVE | Noted: 2023-02-26

## 2023-02-26 PROBLEM — Z34.90 PREGNANCY: Status: ACTIVE | Noted: 2023-02-26

## 2023-02-26 LAB
ANTIBODY SCREEN: NEGATIVE
BASOPHILS # BLD AUTO: 0.06 X10(3) UL (ref 0–0.2)
BASOPHILS NFR BLD AUTO: 0.5 %
DEPRECATED RDW RBC AUTO: 47.4 FL (ref 35.1–46.3)
EOSINOPHIL # BLD AUTO: 0.08 X10(3) UL (ref 0–0.7)
EOSINOPHIL NFR BLD AUTO: 0.7 %
ERYTHROCYTE [DISTWIDTH] IN BLOOD BY AUTOMATED COUNT: 13.6 % (ref 11–15)
HCT VFR BLD AUTO: 37.8 %
HGB BLD-MCNC: 12.8 G/DL
IMM GRANULOCYTES # BLD AUTO: 0.21 X10(3) UL (ref 0–1)
IMM GRANULOCYTES NFR BLD: 1.8 %
LYMPHOCYTES # BLD AUTO: 2.39 X10(3) UL (ref 1–4)
LYMPHOCYTES NFR BLD AUTO: 20.2 %
MCH RBC QN AUTO: 31.9 PG (ref 26–34)
MCHC RBC AUTO-ENTMCNC: 33.9 G/DL (ref 31–37)
MCV RBC AUTO: 94.3 FL
MONOCYTES # BLD AUTO: 0.94 X10(3) UL (ref 0.1–1)
MONOCYTES NFR BLD AUTO: 8 %
NEUTROPHILS # BLD AUTO: 8.13 X10 (3) UL (ref 1.5–7.7)
NEUTROPHILS # BLD AUTO: 8.13 X10(3) UL (ref 1.5–7.7)
NEUTROPHILS NFR BLD AUTO: 68.8 %
PLATELET # BLD AUTO: 182 10(3)UL (ref 150–450)
RBC # BLD AUTO: 4.01 X10(6)UL
RH BLOOD TYPE: POSITIVE
WBC # BLD AUTO: 11.8 X10(3) UL (ref 4–11)

## 2023-02-26 PROCEDURE — 59409 OBSTETRICAL CARE: CPT | Performed by: ADVANCED PRACTICE MIDWIFE

## 2023-02-26 RX ORDER — DOCUSATE SODIUM 100 MG/1
100 CAPSULE, LIQUID FILLED ORAL
Status: DISCONTINUED | OUTPATIENT
Start: 2023-02-26 | End: 2023-02-27

## 2023-02-26 RX ORDER — ONDANSETRON 2 MG/ML
4 INJECTION INTRAMUSCULAR; INTRAVENOUS EVERY 6 HOURS PRN
Status: DISCONTINUED | OUTPATIENT
Start: 2023-02-26 | End: 2023-02-26

## 2023-02-26 RX ORDER — LIDOCAINE HYDROCHLORIDE 10 MG/ML
30 INJECTION, SOLUTION EPIDURAL; INFILTRATION; INTRACAUDAL; PERINEURAL ONCE
Status: DISCONTINUED | OUTPATIENT
Start: 2023-02-26 | End: 2023-02-26

## 2023-02-26 RX ORDER — ACETAMINOPHEN 500 MG
500 TABLET ORAL EVERY 6 HOURS PRN
Status: DISCONTINUED | OUTPATIENT
Start: 2023-02-26 | End: 2023-02-27

## 2023-02-26 RX ORDER — ACETAMINOPHEN 500 MG
1000 TABLET ORAL EVERY 6 HOURS PRN
Status: DISCONTINUED | OUTPATIENT
Start: 2023-02-26 | End: 2023-02-27

## 2023-02-26 RX ORDER — IBUPROFEN 600 MG/1
600 TABLET ORAL EVERY 6 HOURS PRN
Status: DISCONTINUED | OUTPATIENT
Start: 2023-02-26 | End: 2023-02-26

## 2023-02-26 RX ORDER — BISACODYL 10 MG
10 SUPPOSITORY, RECTAL RECTAL ONCE AS NEEDED
Status: DISCONTINUED | OUTPATIENT
Start: 2023-02-26 | End: 2023-02-27

## 2023-02-26 RX ORDER — ONDANSETRON 2 MG/ML
4 INJECTION INTRAMUSCULAR; INTRAVENOUS EVERY 6 HOURS PRN
Status: DISCONTINUED | OUTPATIENT
Start: 2023-02-26 | End: 2023-02-27

## 2023-02-26 RX ORDER — IBUPROFEN 600 MG/1
600 TABLET ORAL EVERY 6 HOURS
Status: DISCONTINUED | OUTPATIENT
Start: 2023-02-26 | End: 2023-02-27

## 2023-02-26 RX ORDER — SODIUM CHLORIDE, SODIUM LACTATE, POTASSIUM CHLORIDE, CALCIUM CHLORIDE 600; 310; 30; 20 MG/100ML; MG/100ML; MG/100ML; MG/100ML
INJECTION, SOLUTION INTRAVENOUS CONTINUOUS
Status: DISCONTINUED | OUTPATIENT
Start: 2023-02-26 | End: 2023-02-26

## 2023-02-26 RX ORDER — CHOLECALCIFEROL (VITAMIN D3) 25 MCG
1 TABLET,CHEWABLE ORAL DAILY
Status: DISCONTINUED | OUTPATIENT
Start: 2023-02-26 | End: 2023-02-27

## 2023-02-26 RX ORDER — ACETAMINOPHEN 500 MG
500 TABLET ORAL EVERY 6 HOURS PRN
Status: DISCONTINUED | OUTPATIENT
Start: 2023-02-26 | End: 2023-02-26

## 2023-02-26 RX ORDER — DEXTROSE, SODIUM CHLORIDE, SODIUM LACTATE, POTASSIUM CHLORIDE, AND CALCIUM CHLORIDE 5; .6; .31; .03; .02 G/100ML; G/100ML; G/100ML; G/100ML; G/100ML
INJECTION, SOLUTION INTRAVENOUS CONTINUOUS
Status: DISCONTINUED | OUTPATIENT
Start: 2023-02-26 | End: 2023-02-26

## 2023-02-26 RX ORDER — DIAPER,BRIEF,INFANT-TODD,DISP
1 EACH MISCELLANEOUS EVERY 6 HOURS PRN
Status: DISCONTINUED | OUTPATIENT
Start: 2023-02-26 | End: 2023-02-27

## 2023-02-26 RX ORDER — SIMETHICONE 80 MG
80 TABLET,CHEWABLE ORAL 3 TIMES DAILY PRN
Status: DISCONTINUED | OUTPATIENT
Start: 2023-02-26 | End: 2023-02-27

## 2023-02-26 RX ORDER — TRISODIUM CITRATE DIHYDRATE AND CITRIC ACID MONOHYDRATE 500; 334 MG/5ML; MG/5ML
30 SOLUTION ORAL AS NEEDED
Status: DISCONTINUED | OUTPATIENT
Start: 2023-02-26 | End: 2023-02-26

## 2023-02-26 RX ORDER — AMMONIA INHALANTS 0.04 G/.3ML
0.3 INHALANT RESPIRATORY (INHALATION) AS NEEDED
Status: DISCONTINUED | OUTPATIENT
Start: 2023-02-26 | End: 2023-02-26

## 2023-02-26 RX ORDER — AMMONIA INHALANTS 0.04 G/.3ML
0.3 INHALANT RESPIRATORY (INHALATION) AS NEEDED
Status: DISCONTINUED | OUTPATIENT
Start: 2023-02-26 | End: 2023-02-27

## 2023-02-26 RX ORDER — TERBUTALINE SULFATE 1 MG/ML
0.25 INJECTION, SOLUTION SUBCUTANEOUS AS NEEDED
Status: DISCONTINUED | OUTPATIENT
Start: 2023-02-26 | End: 2023-02-26

## 2023-02-26 NOTE — PROGRESS NOTES
Patient up to bathroom with assist x 2. Voided 150 to void at this time. Patient transferred to mother/baby room 355 per wheelchair in stable condition with baby and personal belongings. Accompanied by significant other and staff. Report given to mother/baby RN.

## 2023-02-26 NOTE — PROGRESS NOTES
Pt is a 29year old female admitted to TR1/TR1-A. Patient presents with:  R/o Labor: Rich every 10 minutes for over an hour. Endorses fetal movement. Pt is [id number] 38w6d intra-uterine pregnancy. History obtained, consents signed. Oriented to room, staff, and plan of care.

## 2023-02-26 NOTE — PROGRESS NOTES
Pt paged to report contractions that started about an hour ago. States they are still about 15 minutes apart but she sounds uncomfortable when trying to talk thru them. Also reports bloody show since yesterday. She is scheduled for IOL tomorrow and is GBS positive. This is her 5th baby. Pt advised to go to triage for evaluation. She voiced understanding and agreed. Triage nurse notified.

## 2023-02-26 NOTE — L&D DELIVERY NOTE
Felicia Russell [[id number]]    Labor Events     labor?: No   steroids?: None  Antibiotics received during labor?: Yes  Antibiotics (enter # doses in comment): ampicillin  Rupture date/time: 2023 1127     Rupture type: AROM  Fluid color: Clear     Labor Event Times    Labor onset date/time: 2023 0400     Bennett Presentation    No data filed     Operative Delivery    No data filed     Shoulder Dystocia    No data filed     Anesthesia    No data filed     Bennett Delivery    Head delivery date/time: 2023 12:31:58   Delivery date/time:  23 12:32:42     Details:        Delivery Providers     Delivery personnel:  Provider Role    Baby Nurse    Delivery Nurse         Cord    No data filed      Measurements    Weight: 3580 g 7 lb 14.3 oz Length: 50.8 cm   Head circum.: 35.5 cm          Placenta    Date/time: 2023 1238  Removal: Spontaneous     Apgars    Living status: Living   Apgar Scoring Key:    0 1 2    Skin color Blue or pale Acrocyanotic Completely pink    Heart rate Absent <100 bpm >100 bpm    Reflex irritability No response Grimace Cry or active withdrawal    Muscle tone Limp Some flexion Active motion    Respiratory effort Absent Weak cry; hypoventilation Good, crying              1 Minute:  5 Minute:  10 Minute:  15 Minute:  20 Minute:    Skin color: 1  1       Heart rate: 2  2       Reflex irritablity: 2  2       Muscle tone: 2  2       Respiratory effort: 2  2       Total: 9  9          Apgars assigned by: Erica Mohs. [facility] RN     Skin to Skin    No data filed     Vaginal Count    Initial count RN: Greg Rolon RN  Initial count Tech: [address]   Sponges   Sharps    Initial counts 10       Final counts 0          Delivery (Maternal)    No data filed          Sirisha Carrillo got into the tub when she was 8cm. She progressed to complete dilatation in Hands/knees position with spontaneous urges to push prior to pushing successfully to viable baby boy.  See Delivery Summary above for time, APGARs, and weight. Sweep for nuchal cord was performed and no nuchal cord identified. Anterior shoulder delivered spontaneously without traction. Baby kept immersed in water and then brought to mother's chest and became vigorous with stimulation. Mother assisted to bed. Cord clamped and then cut by FOB after pulsing ceased. Prophylactic IV pitocin initiated in 3rd stage. Spontaneous placenta by Za Ponds  mechanism, complete with 3 vessel cord. Hemostasis achieved by fundal massage and prophylactic IV Pitocin. Vagina and perineum inspected and perineal abrasion noted not requiring repair. Mother and infant appeared in stable condition when midwife left the delivery room. Sharps and 4x4 counts were correct. Breastfeeding initiated. Placenta to pathology d/t hx COVID during pregnancy. Andria Evangelista CNM present during delivery.     Quantitative Blood Loss (mL)   125

## 2023-02-26 NOTE — PLAN OF CARE
Problem: PAIN - ADULT  Goal: Verbalizes/displays adequate comfort level or patient's stated pain goal  Description: INTERVENTIONS:  - Encourage pt to monitor pain and request assistance  - Assess pain using appropriate pain scale  - Administer analgesics based on type and severity of pain and evaluate response  - Implement non-pharmacological measures as appropriate and evaluate response  - Consider cultural and social influences on pain and pain management  - Manage/alleviate anxiety  - Utilize distraction and/or relaxation techniques  - Monitor for opioid side effects  - Notify MD/LIP if interventions unsuccessful or patient reports new pain  - Anticipate increased pain with activity and pre-medicate as appropriate  2/26/2023 1516 by Mariel Miranda RN  Outcome: Progressing  2/26/2023 1516 by Mariel Miarnda RN  Outcome: Progressing     Problem: ANXIETY  Goal: Will report anxiety at manageable levels  Description: INTERVENTIONS:  - Administer medication as ordered  - Teach and rehearse alternative coping skills  - Provide emotional support with 1:1 interaction with staff  2/26/2023 1516 by Mariel Miranda RN  Outcome: Progressing  2/26/2023 1516 by Mariel Miranda RN  Outcome: Progressing     Problem: POSTPARTUM  Goal: Long Term Goal:Experiences normal postpartum course  Description: INTERVENTIONS:  - Assess and monitor vital signs and lab values. - Assess fundus and lochia. - Provide ice/sitz baths for perineum discomfort. - Monitor healing of incision/episiotomy/laceration, and assess for signs and symptoms of infection and hematoma. - Assess bladder function and monitor for bladder distention.  - Provide/instruct/assist with pericare as needed. - Provide VTE prophylaxis as needed. - Monitor bowel function.  - Encourage ambulation and provide assistance as needed. - Assess and monitor emotional status and provide social service/psych resources as needed.   - Utilize standard precautions and use personal protective equipment as indicated. Ensure aseptic care of all intravenous lines and invasive tubes/drains.  - Obtain immunization and exposure to communicable diseases history. Outcome: Progressing  Goal: Optimize infant feeding at the breast  Description: INTERVENTIONS:  - Initiate breast feeding within first hour after birth. - Monitor effectiveness of current breast feeding efforts. - Assess support systems available to mother/family.  - Identify cultural beliefs/practices regarding lactation, letdown techniques, maternal food preferences. - Assess mother's knowledge and previous experience with breast feeding.  - Provide information as needed about early infant feeding cues (e.g., rooting, lip smacking, sucking fingers/hand) versus late cue of crying.  - Discuss/demonstrate breast feeding aids (e.g., infant sling, nursing footstool/pillows, and breast pumps). - Encourage mother/other family members to express feelings/concerns, and actively listen. - Educate father/SO about benefits of breast feeding and how to manage common lactation challenges. - Recommend avoidance of specific medications or substances incompatible with breast feeding.  - Assess and monitor for signs of nipple pain/trauma. - Instruct and provide assistance with proper latch. - Review techniques for milk expression (breast pumping) and storage of breast milk. Provide pumping equipment/supplies, instructions and assistance, as needed. - Encourage rooming-in and breast feeding on demand.  - Encourage skin-to-skin contact. - Provide LC support as needed. - Assess for and manage engorgement. - Provide breast feeding education handouts and information on community breast feeding support. Outcome: Progressing  Goal: Establishment of adequate milk supply with medication/procedure interruptions  Description: INTERVENTIONS:  - Review techniques for milk expression (breast pumping).    - Provide pumping equipment/supplies, instructions, and assistance until it is safe to breastfeed infant. Outcome: Progressing  Goal: Appropriate maternal -  bonding  Description: INTERVENTIONS:  - Assess caregiver- interactions. - Assess caregiver's emotional status and coping mechanisms. - Encourage caregiver to participate in  daily care. - Assess support systems available to mother/family.  - Provide /case management support as needed. Outcome: Progressing        Received patient into room 355 via wheel chair . Bedside shift report received from [facility]. Pt transferred to bed. Bed in lowest and locked position. Side rails up x2. VSS. IV site unremarkable. Baby present in open crib. ID bands matched with L&D RN. Patient and family oriented to unit, room and call light within reach. Safety measures in place, POC followed. Will continue to monitor per protocol. Advised to call for assistance to bathroom.

## 2023-02-27 ENCOUNTER — TELEPHONE (OUTPATIENT)
Dept: OBGYN CLINIC | Facility: CLINIC | Age: 33
End: 2023-02-27

## 2023-02-27 VITALS
TEMPERATURE: 98 F | DIASTOLIC BLOOD PRESSURE: 74 MMHG | RESPIRATION RATE: 16 BRPM | SYSTOLIC BLOOD PRESSURE: 124 MMHG | HEART RATE: 84 BPM

## 2023-02-27 LAB
BASOPHILS # BLD AUTO: 0.07 X10(3) UL (ref 0–0.2)
BASOPHILS NFR BLD AUTO: 0.5 %
DEPRECATED RDW RBC AUTO: 49.1 FL (ref 35.1–46.3)
EOSINOPHIL # BLD AUTO: 0.13 X10(3) UL (ref 0–0.7)
EOSINOPHIL NFR BLD AUTO: 0.9 %
ERYTHROCYTE [DISTWIDTH] IN BLOOD BY AUTOMATED COUNT: 13.8 % (ref 11–15)
HCT VFR BLD AUTO: 35.6 %
HGB BLD-MCNC: 11.7 G/DL
IMM GRANULOCYTES # BLD AUTO: 0.21 X10(3) UL (ref 0–1)
IMM GRANULOCYTES NFR BLD: 1.5 %
LYMPHOCYTES # BLD AUTO: 3.4 X10(3) UL (ref 1–4)
LYMPHOCYTES NFR BLD AUTO: 24 %
MCH RBC QN AUTO: 31.7 PG (ref 26–34)
MCHC RBC AUTO-ENTMCNC: 32.9 G/DL (ref 31–37)
MCV RBC AUTO: 96.5 FL
MONOCYTES # BLD AUTO: 1.12 X10(3) UL (ref 0.1–1)
MONOCYTES NFR BLD AUTO: 7.9 %
NEUTROPHILS # BLD AUTO: 9.23 X10 (3) UL (ref 1.5–7.7)
NEUTROPHILS # BLD AUTO: 9.23 X10(3) UL (ref 1.5–7.7)
NEUTROPHILS NFR BLD AUTO: 65.2 %
PLATELET # BLD AUTO: 179 10(3)UL (ref 150–450)
RBC # BLD AUTO: 3.69 X10(6)UL
WBC # BLD AUTO: 14.2 X10(3) UL (ref 4–11)

## 2023-02-27 NOTE — CM/SW NOTE
SHANDRA self referral due to [employer]. SW met with patient at bedside. SW confirmed face sheet contact as correct. Baby boy name:TONYA. Date of delivery:2/26/2023  Time of delivery: 12:32  Delivery method:Water birth. Siblings age:9years old, 10years old, 1years old, and 4 months. Patient employed: Yes. Length of maternity leave:6 weeks. Father of baby employed:Yes. Length of paternity leave:2 week. Breast or formula feed:Breast feed. Pediatrician:TONYA. SW encouraged pt to schedule infant first appointment (usually within 48 hours of discharge) prior to pt discharge. Pt expressed understanding. Infant Insurance:Medicaid. Change HC contacted:Yes. Mental Health History: Denied. Medications:Denied. Therapist:Denied. Psychiatrist:Denied. SW intern discussed signs, symptoms and risks associated with post partum depression & anxiety. SW intern provided pt with PMAD resources. Other resources provided:[facility]. Patient support system:Pt's . Patient denied current questions/needs from SHANDRA.     SHANDRA/CM to remain available for support and/or discharge planning.        [address] [facility] Work Intern

## 2023-02-27 NOTE — LACTATION NOTE
LACTATION NOTE - MOTHER      Evaluation Type: Inpatient    Problems identified  Problems identified: Knowledge deficit         Breastfeeding goal  Breastfeeding goal: To maintain breast milk feeding per patient goal    Maternal Assessment  Bilateral Breasts: Soft;Symmetrical  Bilateral Nipples: Colostrum easily expressed; Everted  Prior breastfeeding experience (comment below): Multip; Successful  Breastfeeding Assistance: Breastfeeding assistance provided with permission    Pain assessment  Treatment of Sore Nipples: Deeper latch techniques    Guidelines for use of:  Breast pump type: Motif  Suggested use of pump: Pump each time a supplement is offered  Other (comment): Mom reports infant fussy at breast, has been supplementing with formula. Latch assessment done, infant quickly latched but after several minutes became fussy at breast and reluctant to latch. Offered few mls of formula then quickly transitioned back to breast. Latch sustained, infant calm at breast with regular sucking bursts. Provided discharge education, including pumping guidelines and LC  services.

## 2023-02-27 NOTE — DISCHARGE SUMMARY
[facility]    Discharge Summary    Shira Hairston Patient Status:  Inpatient    1990 MRN [id number]   Location [facility] 3SE Attending Phi Allen, [address] Day # 1       Delivering OB Clinician: Dale Alba CNM    [facility]: Estimated Date of Delivery: 3/6/23    Gestational Age: 38w7d    Antepartum complications: Patient Active Problem List:     History of  death     GBS bacteriuria     Pregnant     Hx of precipitous labor and deliveries, antepartum     Excessive weight gain during pregnancy     COVID-19 affecting pregnancy in third trimester     Refuses tetanus, diphtheria, and acellular pertussis (Tdap) vaccination     Fall     Elevated glucose tolerance test     Pregnancy      (normal spontaneous vaginal delivery)      Date of Delivery: 2023 Time of Delivery: 12:32 PM    Delivery Type: spontaneous vaginal delivery    Baby: Liveborn male Information for the patient's : Jesenia Benton [[id number]]   7 lb 14.3 oz (3.58 kg)  Apgars:  1 minute: 9  5 minutes: 910 minutes:       Intrapartum Complications: None    Admit Date: 2023    Discharge Date: 2023    Hospital Course: No complications Routine delivery and postpartum care    Discharged Condition: stable    Disposition: home    Plan:     Follow-up appointment in 2 weeks with CARMENZA Morales CNM  2023  1:58 PM

## 2023-02-27 NOTE — LACTATION NOTE
This note was copied from a baby's chart. LACTATION NOTE - INFANT    Evaluation Type  Evaluation Type: Inpatient    Problems & Assessment  Problems Diagnosed or Identified: Latch difficulty  Infant Assessment: Hunger cues present  Muscle tone: Appropriate for GA    Feeding Assessment  Summary Current Feeding: Adlib;Breastfeeding with formula supplement  Breastfeeding Assessment: Assisted with breastfeeding w/mother's permission;Sustained nutrititive latch w/audible swallows; Coordinated suck/swallow; Tolerated feeding well  Breastfeeding Positions: cradle;right breast;left breast;cross cradle  Latch: Repeated attempts, hold nipple in mouth, stimulate to suck  Audible Sucks/Swallows: Spontaneous and intermittent (24 hours old)  Type of Nipple: Everted (after stimulation)  Comfort (Breast/Nipple): Filling, red/small blisters/bruises, mild/mod discomfort  Hold (Positioning): Full assist, teach one side, mother does other, staff holds  [facility] Score: 7

## 2023-03-14 ENCOUNTER — TELEMEDICINE (OUTPATIENT)
Dept: OBGYN CLINIC | Facility: CLINIC | Age: 33
End: 2023-03-14

## 2023-03-14 NOTE — PROGRESS NOTES
This visit is conducted using Telemedicine with live, interactive video and audio. Patient has been referred to the [facility] website at [email or web address] to review the yearly Consent to Treat document. Patient understands and accepts financial responsibility for any deductible, co-insurance and/or co-pays associated with this service. Duration of face-to-face time in visit was 10 minutes. Subjective: Kayden Reynolds is 2 weeks postpartum after a vaginal delivery of a baby boy. See L&D delivery summary for birth statistics. She is without concerns today. Bleeding is decreasing and not experiencing any excessive pain. Breastfeeding successfully and reports infant is experiencing adequate weight gain. She denies any signs/symptoms of postpartum depression and has adequate family support. The birthday of her baby that  is coming up so she has been feeling more anxious and emotional due to that but she feels that it is appropriate and definitely manageable. She has reached out to one of the therapist referrals that she received from the [address] [place] [address]. Overall she feels she is coping well emotionally with the transition. Denies any abuse. Contraceptive plan: desires paragard IUD at 6 week visit. Tried it in the past and liked it but it came out. Review of Systems   Constitutional: Negative for chills and fever. Eyes: Negative for blurred vision. Respiratory: Negative for cough, shortness of breath and wheezing. Cardiovascular: Negative for chest pain and palpitations. Gastrointestinal: Negative for diarrhea, nausea and vomiting. Genitourinary: Negative for dysuria and frequency. Neurological: Negative for dizziness and headaches. Psychiatric/Behavioral: Negative for depression and suicidal ideas. Objective: There were no vitals filed for this visit.   Wt Readings from Last 6 Encounters:  23 : 187 lb (84.8 kg)  23 : 187 lb (84.8 kg)  23 : 185 lb (83.9 kg)  02/02/23 : 179 lb (81.2 kg)  01/25/23 : 179 lb (81.2 kg)  01/17/23 : 178 lb (80.7 kg)      Physical Exam  Constitutional:       General: She is not in acute distress. Appearance: Normal appearance. HENT:      Head: Normocephalic and atraumatic. Pulmonary:      Effort: Pulmonary effort is normal. No respiratory distress. Neurological:      Mental Status: She is alert and oriented to person, place, and time. Psychiatric:         Mood and Affect: Mood normal.         Behavior: Behavior normal.         Thought Content: Thought content normal.         Judgment: Judgment normal.   Vitals reviewed. Assessment/Plan:   2 weeks postpartum, stable. Breastfeeding without difficulty  Contraception: plans paragard IUD insertion at 6 week postpartum visit  Today's Plan: postpartum warning signs reviewed   Return to clinic: 4 weeks for 6w PPV    Counseling included:   Signs/symptoms of postpartum depression  Postpartum danger signs  Lactation support and troubleshooting  Maternal and family adaption  Sleep/rest strategies  Sexuality  Infant safety and care  Parenting concerns  Occupational concerns  Contraception    Clarissa Pack verbalized understanding of plan of care. All questions answered. No barriers to learning identified.

## 2023-03-30 ENCOUNTER — TELEPHONE (OUTPATIENT)
Dept: OBGYN UNIT | Facility: HOSPITAL | Age: 33
End: 2023-03-30

## 2023-04-10 ENCOUNTER — POSTPARTUM (OUTPATIENT)
Dept: OBGYN CLINIC | Facility: CLINIC | Age: 33
End: 2023-04-10

## 2023-04-10 VITALS
HEIGHT: 62 IN | BODY MASS INDEX: 30.36 KG/M2 | SYSTOLIC BLOOD PRESSURE: 120 MMHG | WEIGHT: 165 LBS | DIASTOLIC BLOOD PRESSURE: 81 MMHG | HEART RATE: 71 BPM

## 2023-04-10 DIAGNOSIS — M62.08 DIASTASIS RECTI: ICD-10-CM

## 2023-04-10 DIAGNOSIS — Z32.00 PREGNANCY EXAMINATION OR TEST, PREGNANCY UNCONFIRMED: ICD-10-CM

## 2023-04-10 DIAGNOSIS — N81.89 WEAKNESS OF PELVIC FLOOR: ICD-10-CM

## 2023-04-10 DIAGNOSIS — Z30.430 ENCOUNTER FOR IUD INSERTION: ICD-10-CM

## 2023-04-10 LAB
CONTROL LINE PRESENT WITH A CLEAR BACKGROUND (YES/NO): YES YES/NO
PREGNANCY TEST, URINE: NEGATIVE

## 2023-04-10 RX ORDER — COPPER 313.4 MG/1
1 INTRAUTERINE DEVICE INTRAUTERINE ONCE
Status: COMPLETED | OUTPATIENT
Start: 2023-04-10 | End: 2023-04-10

## 2023-04-10 RX ADMIN — COPPER 1 DEVICE: 313.4 INTRAUTERINE DEVICE INTRAUTERINE at 17:39:00

## 2023-04-10 NOTE — PROCEDURES
IUD Insertion     Pregnancy Results: negative from urine test   Birth control method(s) used:  ; date last used:  Recent pregnancy. No intercourse since delivery    Consent signed. Procedure discussed with the patient in detail including indication, risks, benefits, alternatives and complications. Pelvic Exam Findings:  Pelvic exam WNL    Procedure:  Speculum placed in the vagina. Betadine wash of vagina and cervix. Uterus sounded to 7.5 cm. Paragard IUD was placed without difficulty. Strings cut at 3 cm. GC/CHL screen performed. Patient tolerated procedure well. Visit Plan:  IUD surveillance was discussed with the patient.

## 2024-10-23 ENCOUNTER — OFFICE VISIT (OUTPATIENT)
Dept: OBGYN CLINIC | Facility: CLINIC | Age: 34
End: 2024-10-23
Payer: COMMERCIAL

## 2024-10-23 VITALS
SYSTOLIC BLOOD PRESSURE: 120 MMHG | BODY MASS INDEX: 24.66 KG/M2 | DIASTOLIC BLOOD PRESSURE: 83 MMHG | HEIGHT: 62 IN | WEIGHT: 134 LBS | HEART RATE: 82 BPM

## 2024-10-23 DIAGNOSIS — Z30.432 ENCOUNTER FOR REMOVAL OF INTRAUTERINE CONTRACEPTIVE DEVICE: Primary | ICD-10-CM

## 2024-10-23 DIAGNOSIS — Z01.818 PREPROCEDURAL EXAMINATION: ICD-10-CM

## 2024-10-23 PROBLEM — Z34.90 PREGNANCY (HCC): Status: RESOLVED | Noted: 2023-02-26 | Resolved: 2024-10-23

## 2024-10-23 PROBLEM — O26.00 EXCESSIVE WEIGHT GAIN DURING PREGNANCY (HCC): Status: RESOLVED | Noted: 2022-11-06 | Resolved: 2024-10-23

## 2024-10-23 PROBLEM — U07.1 COVID-19 AFFECTING PREGNANCY IN THIRD TRIMESTER (HCC): Status: RESOLVED | Noted: 2023-01-17 | Resolved: 2024-10-23

## 2024-10-23 PROBLEM — R82.71 GBS BACTERIURIA: Status: RESOLVED | Noted: 2022-08-31 | Resolved: 2024-10-23

## 2024-10-23 PROBLEM — O98.513 COVID-19 AFFECTING PREGNANCY IN THIRD TRIMESTER (HCC): Status: RESOLVED | Noted: 2023-01-17 | Resolved: 2024-10-23

## 2024-10-23 PROBLEM — Z28.21 REFUSES TETANUS, DIPHTHERIA, AND ACELLULAR PERTUSSIS (TDAP) VACCINATION: Status: RESOLVED | Noted: 2023-01-17 | Resolved: 2024-10-23

## 2024-10-23 PROBLEM — Z34.90 PREGNANT (HCC): Status: RESOLVED | Noted: 2022-11-06 | Resolved: 2024-10-23

## 2024-10-23 PROBLEM — O09.219: Status: RESOLVED | Noted: 2022-11-06 | Resolved: 2024-10-23

## 2024-10-23 PROBLEM — R73.09 ELEVATED GLUCOSE TOLERANCE TEST: Status: RESOLVED | Noted: 2023-02-06 | Resolved: 2024-10-23

## 2024-10-23 PROCEDURE — 81025 URINE PREGNANCY TEST: CPT | Performed by: ADVANCED PRACTICE MIDWIFE

## 2024-10-23 PROCEDURE — 58301 REMOVE INTRAUTERINE DEVICE: CPT | Performed by: ADVANCED PRACTICE MIDWIFE

## 2024-10-23 PROCEDURE — 3074F SYST BP LT 130 MM HG: CPT | Performed by: ADVANCED PRACTICE MIDWIFE

## 2024-10-23 PROCEDURE — 3079F DIAST BP 80-89 MM HG: CPT | Performed by: ADVANCED PRACTICE MIDWIFE

## 2024-10-23 PROCEDURE — 3008F BODY MASS INDEX DOCD: CPT | Performed by: ADVANCED PRACTICE MIDWIFE

## 2024-10-23 NOTE — PROCEDURES
IUD Removal     Pregnancy Results: negative from urine test   Birth control method(s) used:   iud     Consent signed.  Procedure discussed with the patient in detail including indication, risks, benefits, alternatives and complications.    Pelvic Exam Findings:  Lesion description: cervix grossly normal    Procedure:  Speculum placed in the vagina.  IUD strings visualized  An ring clamp used to grasp IUD strings.   IUD was removed without difficulty.   Appears complete and intact visualized by patient  The patient tolerated the procedure well.      Visit Plan:  Discharge instructions were reviewed with the patient.

## 2024-12-17 ENCOUNTER — OFFICE VISIT (OUTPATIENT)
Dept: OBGYN CLINIC | Facility: CLINIC | Age: 34
End: 2024-12-17

## 2024-12-17 VITALS
HEART RATE: 72 BPM | BODY MASS INDEX: 25.4 KG/M2 | WEIGHT: 138 LBS | SYSTOLIC BLOOD PRESSURE: 103 MMHG | DIASTOLIC BLOOD PRESSURE: 66 MMHG | HEIGHT: 62 IN

## 2024-12-17 DIAGNOSIS — Z12.4 CERVICAL CANCER SCREENING: ICD-10-CM

## 2024-12-17 DIAGNOSIS — Z31.9 PATIENT DESIRES PREGNANCY: ICD-10-CM

## 2024-12-17 DIAGNOSIS — N89.8 VAGINAL DISCHARGE: Primary | ICD-10-CM

## 2024-12-17 PROCEDURE — 3008F BODY MASS INDEX DOCD: CPT | Performed by: ADVANCED PRACTICE MIDWIFE

## 2024-12-17 PROCEDURE — 3078F DIAST BP <80 MM HG: CPT | Performed by: ADVANCED PRACTICE MIDWIFE

## 2024-12-17 PROCEDURE — 99213 OFFICE O/P EST LOW 20 MIN: CPT | Performed by: ADVANCED PRACTICE MIDWIFE

## 2024-12-17 PROCEDURE — 3074F SYST BP LT 130 MM HG: CPT | Performed by: ADVANCED PRACTICE MIDWIFE

## 2024-12-17 RX ORDER — PNV NO.95/FERROUS FUM/FOLIC AC 28MG-0.8MG
1 TABLET ORAL DAILY
Qty: 90 TABLET | Refills: 3 | Status: SHIPPED | OUTPATIENT
Start: 2024-12-17 | End: 2025-01-16

## 2024-12-17 NOTE — PROGRESS NOTES
Chief Complaint   Patient presents with    Gyn Exam     Pt believes she might have a yeast infection or bv. Pt is having some discharge and an odor     HPI:   Lauren is 34 year old , here today with c/o vaginal discharge white/cloudy and fishy odor. Has been going on for a while, not exactly sure when it started.   Denies itching, burning, irritation.  Was treated for BV about 3 months ago for the first time.   Had pIUD removed 10/2024, desires pregnancy. Not taking prenatal vitamins.  Last pap 2021 NILM, denies hx abnormal    Pt offered for MA to be present during exam and patient declines    Patient Active Problem List   Diagnosis    History of  death    Fall        Note: This is a gyn only visit. Pt has PCP and is referred back to PCP for care of any non-gyn concerns listed above in the Problem List.    Medications (Active prior to today's visit):  Current Outpatient Medications   Medication Sig Dispense Refill    Prenatal Vit-Fe Fumarate-FA (PRENATAL VITAMIN) 27-0.8 MG Oral Tab Take 1 tablet by mouth daily. 90 tablet 3    Misc. Devices (BREAST PUMP) Does not apply Misc Double electric breast pump LANDRY-3/6/23 (Patient not taking: Reported on 2024) 1 each 0    Prenatal Vit-Fe Fumarate-FA (PRENATAL VITAMINS) 28-0.8 MG Oral Tab Take 1 tablet by mouth daily. (Patient not taking: Reported on 4/10/2023) 30 tablet 5    sertraline 25 MG Oral Tab Take 1 tablet (25 mg total) by mouth daily. (Patient not taking: Reported on 4/10/2023) 30 tablet 11    Docosahexaenoic Acid (DHA COMPLETE OR) Take by mouth. (Patient not taking: Reported on 4/10/2023)         Allergies:  Allergies[1]    ROS:  Review of Systems   Constitutional: Negative.    Respiratory: Negative.     Cardiovascular: Negative.    Gastrointestinal: Negative.    Genitourinary:  Positive for vaginal discharge. Negative for difficulty urinating, dyspareunia, dysuria, frequency, genital sores, hematuria, menstrual problem, pelvic pain, urgency,  vaginal bleeding and vaginal pain.   Neurological: Negative.    Psychiatric/Behavioral: Negative.         PHYSICAL EXAM:  Vitals:    12/17/24 1052   BP: 103/66   Pulse: 72     Physical Exam  Vitals and nursing note reviewed.   Constitutional:       General: She is not in acute distress.     Appearance: Normal appearance. She is not ill-appearing.   HENT:      Head: Normocephalic and atraumatic.   Cardiovascular:      Rate and Rhythm: Normal rate.   Pulmonary:      Effort: Pulmonary effort is normal. No respiratory distress.   Genitourinary:     General: Normal vulva.      Exam position: Lithotomy position.      Labia:         Right: No rash, tenderness, lesion or injury.         Left: No rash, tenderness, lesion or injury.       Urethra: No prolapse, urethral pain, urethral swelling or urethral lesion.      Vagina: Normal. No vaginal discharge, tenderness or lesions.      Cervix: No discharge, lesion or erythema.      Comments: Eggwhite cervical mucous  Skin:     General: Skin is warm and dry.   Neurological:      Mental Status: She is alert and oriented to person, place, and time.   Psychiatric:         Mood and Affect: Mood normal.         Behavior: Behavior normal.         Thought Content: Thought content normal.         Judgment: Judgment normal.        No results found for this or any previous visit (from the past 24 hours).      ASSESSMENT/PLAN:     Lauren was seen today for gyn exam.    Diagnoses and all orders for this visit:    Vaginal discharge  -     Vaginitis Vaginosis PCR Panel; Future  -     Vaginitis Vaginosis PCR Panel    Cervical cancer screening  -     ThinPrep PAP Smear; Future  -     Hpv Dna  High Risk , Thin Prep Collect; Future  -     ThinPrep PAP Smear  -     Hpv Dna  High Risk , Thin Prep Collect    Patient desires pregnancy  -     Prenatal Vit-Fe Fumarate-FA (PRENATAL VITAMIN) 27-0.8 MG Oral Tab; Take 1 tablet by mouth daily.    -- Vaginal hygiene practices reviewed. Encouraged probiotics.  Will treat based on results.  -- Pre-conception counseling reviewed. Discussed importance of taking PNV while TTC.      Follow-up/Return to clinic: PRN or for next annual    Counseling:   Best hygiene practices  Frequency of health screening and personal risks  Pap, SBE/CBE, mammography  Smoking cessation/avoidance encouraged  Preconception counseling, including use of folic acid    I have spent 20 minutes total time on the day of the encounter, including: preparing to see the patient, ordering/reviewing labs, performing a medically appropriate exam, and providing care coordination. face to face counseling, chart review, orders and coordination of care    Patient verbalized understanding, All questions answered. No barriers to learning identified         [1] No Known Allergies

## 2024-12-18 ENCOUNTER — TELEPHONE (OUTPATIENT)
Dept: OBGYN CLINIC | Facility: CLINIC | Age: 34
End: 2024-12-18

## 2024-12-18 LAB
BV BACTERIA DNA VAG QL NAA+PROBE: POSITIVE
C GLABRATA DNA VAG QL NAA+PROBE: NEGATIVE
C KRUSEI DNA VAG QL NAA+PROBE: NEGATIVE
CANDIDA DNA VAG QL NAA+PROBE: NEGATIVE
HPV E6+E7 MRNA CVX QL NAA+PROBE: NEGATIVE
T VAGINALIS DNA VAG QL NAA+PROBE: NEGATIVE

## 2024-12-18 RX ORDER — METRONIDAZOLE 7.5 MG/G
1 GEL VAGINAL NIGHTLY
Qty: 1 EACH | Refills: 0 | Status: SHIPPED | OUTPATIENT
Start: 2024-12-18 | End: 2024-12-23

## (undated) NOTE — LETTER
AUTHORIZATION FOR SURGICAL OPERATION OR OTHER PROCEDURE    1. I hereby authorize Blossom Mares CNM, and Spyder Lynk [facility] staff assigned to my case to perform the following operation and/or procedure at the [place] [employer] [facility]:    IUD INSERTION ___________________________________________________________________________      _______________________________________________________________________________________________    2. My physician has explained the nature and purpose of the operation or other procedure, possible alternative methods of treatment, the risks involved, and the possibility of complication to me. I acknowledge that no guarantee has been made as to the result that may be obtained. 3.  I recognize that, during the course of this operation, or other procedure, unforseen conditions may necessitate additional or different procedure than those listed above. I, therefore, further authorize and request that the above named physician, his/her physician assistants or designees perform such procedures as are, in his/her professional opinion, necessary and desirable. 4.  Any tissue or organs removed in the operation or other procedure may be disposed of by and at the discretion of the [place] [employer] [facility] and [facility]. 5.  I understand that in the event of a medical emergency, I will be transported by local paramedics to [facility] or other hospital emergency department. 6.  I certify that I have read and fully understand the above consent to operation and/or other procedure. 7.  I acknowledge that my physician has explained sedation/analgesia administration to me including the risks and benefits. I consent to the administration of sedation/analgesia as may be necessary or desirable in the judgement of my physician. Witness signature: ___________________________________________________ Date:  ______/______/_____                    Time:  ________ A. M.  P.M. Patient Name:  ______________________________________________________  (please print)      Patient signature:  ___________________________________________________             Relationship to Patient:           []  Parent    Responsible person                          []  Spouse  In case of minor or                    [] Other  _____________   Incompetent name:  __________________________________________________                               (please print)      _____________      Responsible person  In case of minor or  Incompetent signature:  _______________________________________________    Statement of Physician  My signature below affirms that prior to the time of the procedure, I have explained to the patient and/or his/her guardian, the risks and benefits involved in the proposed treatment and any reasonable alternative to the proposed treatment. I have also explained the risks and benefits involved in the refusal of the proposed treatment and have answered the patient's questions.                         Date:  ______/______/_______  Provider                      Signature:  __________________________________________________________       Time:  ___________ A.M    P.M.

## (undated) NOTE — LETTER
[facility]  Administration of Anesthesia  1. I, Dawood Rodríguez, or _________________________________ acting on her behalf, (Patient) (Dependent/Representative) request to receive anesthesia for my pending procedure/operation/treatment.   A bleeding, seizure, cardiac arrest and death. 7. AWARENESS: I understand that it is possible (but unlikely) to have explicit memory of events from the operating room while under general anesthesia.   8. ELECTROCONVULSIVE THERAPY PATIENTS: This consent serve below affirms that prior to the time of the procedure, I have explained to the patient and/or his/her guardian, the risks and benefits of undergoing anesthesia, as well as any reasonable alternatives.     ___________________________________________________

## (undated) NOTE — LETTER
AUTHORIZATION FOR SURGICAL OPERATION OR OTHER PROCEDURE    1. I hereby authorize Beverly Ramey, and [facility] staff assigned to my case to perform the following operation and/or procedure at the [facility] site:    _______________________________________________________________________________________________    Iud Removal   _______________________________________________________________________________________________    2.  My physician has explained the nature and purpose of the operation or other procedure, possible alternative methods of treatment, the risks involved, and the possibility of complication to me.  I acknowledge that no guarantee has been made as to the result that may be obtained.  3.  I recognize that, during the course of this operation, or other procedure, unforseen conditions may necessitate additional or different procedure than those listed above.  I, therefore, further authorize and request that the above named physician, his/her physician assistants or designees perform such procedures as are, in his/her professional opinion, necessary and desirable.  4.  Any tissue or organs removed in the operation or other procedure may be disposed of by and at the discretion of the [facility] and [facility].  5.  I understand that in the event of a medical emergency, I will be transported by local paramedics to [facility] or other hospital emergency department.  6.  I certify that I have read and fully understand the above consent to operation and/or other procedure.    7.  I acknowledge that my physician has explained sedation/analgesia administration to me including the risks and benefits.  I consent to the administration of sedation/analgesia as may be necessary or desirable in the judgement of my physician.    Witness signature: ___________________________________________________ Date:  ______/______/_____                    Time:   ________ A.M.  P.M.       Patient Name:  ______________________________________________________  (please print)      Patient signature:  ___________________________________________________             Relationship to Patient:           []  Parent    Responsible person                          []  Spouse  In case of minor or                    [] Other  _____________   Incompetent name:  __________________________________________________                               (please print)      _____________      Responsible person  In case of minor or  Incompetent signature:  _______________________________________________    Statement of Physician  My signature below affirms that prior to the time of the procedure, I have explained to the patient and/or his/her guardian, the risks and benefits involved in the proposed treatment and any reasonable alternative to the proposed treatment.  I have also explained the risks and benefits involved in the refusal of the proposed treatment and have answered the patient's questions.                        Date:  ______/______/_______  Provider                      Signature:  __________________________________________________________       Time:  ___________ A.M    P.M.

## (undated) NOTE — LETTER
VACCINE ADMINISTRATION RECORD  PARENT / GUARDIAN APPROVAL  Date: 2021  Vaccine administered to: Karla Dawn     : 1990    MRN: [id number]    A copy of the appropriate Centers for Disease Control and Prevention Vaccine Information statement

## (undated) NOTE — LETTER
[facility]  Administration of Anesthesia  1. I, Rahul Ing, or _________________________________ acting on her behalf, (Patient) (Dependent/Representative) request to receive anesthesia for my pending procedure/operation/treatment.   A bleeding, seizure, cardiac arrest and death. 7. AWARENESS: I understand that it is possible (but unlikely) to have explicit memory of events from the operating room while under general anesthesia.   8. ELECTROCONVULSIVE THERAPY PATIENTS: This consent serve below affirms that prior to the time of the procedure, I have explained to the patient and/or his/her guardian, the risks and benefits of undergoing anesthesia, as well as any reasonable alternatives.     ___________________________________________________

## (undated) NOTE — LETTER
VACCINE ADMINISTRATION RECORD  PARENT / GUARDIAN APPROVAL  Date: 2019  Vaccine administered to: Joselin Granger     : 1990    MRN: [id number]    A copy of the appropriate Centers for Disease Control and Prevention Vaccine Information statement

## (undated) NOTE — MR AVS SNAPSHOT
After Visit Summary   8/13/2021    Britt Sevilla    MRN: [id number]           Visit Information     Date & Time  8/13/2021  2:30 PM Provider  Masood Salter, [address] [place] [address], [address] [place] [address],[address], [facility].  Phone headache and pink eye. The cost for a Video Visit is currently $35.         If you receive a survey from [employer], please take a few minutes to complete it and provide feedback.  We strive to deliver the best patient experience and are looking for ways available by appointment Average cost  $120*     EMERGENCY ROOM Life-threatening emergencies needing immediate intervention at a hospital emergency room.  Average cost  $2,300*   *Cost varies based on your insurance coverage  For more information about hour